# Patient Record
Sex: FEMALE | Race: BLACK OR AFRICAN AMERICAN | Employment: UNEMPLOYED | URBAN - METROPOLITAN AREA
[De-identification: names, ages, dates, MRNs, and addresses within clinical notes are randomized per-mention and may not be internally consistent; named-entity substitution may affect disease eponyms.]

---

## 2016-08-23 LAB
ANTIBODY SCREEN, EXTERNAL: NORMAL
HBSAG, EXTERNAL: NORMAL
RUBELLA, EXTERNAL: NORMAL
T. PALLIDUM, EXTERNAL: NORMAL
TYPE, ABO & RH, EXTERNAL: NORMAL

## 2017-03-01 LAB — GRBS, EXTERNAL: NORMAL

## 2017-03-23 ENCOUNTER — HOSPITAL ENCOUNTER (INPATIENT)
Age: 27
LOS: 2 days | Discharge: HOME | End: 2017-03-25
Attending: OBSTETRICS & GYNECOLOGY | Admitting: OBSTETRICS & GYNECOLOGY
Payer: COMMERCIAL

## 2017-03-23 ENCOUNTER — ANESTHESIA EVENT (OUTPATIENT)
Dept: LABOR AND DELIVERY | Age: 27
End: 2017-03-23
Payer: COMMERCIAL

## 2017-03-23 ENCOUNTER — ANESTHESIA (OUTPATIENT)
Dept: LABOR AND DELIVERY | Age: 27
End: 2017-03-23
Payer: COMMERCIAL

## 2017-03-23 PROBLEM — Z34.90 PREGNANCY: Status: ACTIVE | Noted: 2017-03-23

## 2017-03-23 LAB
ALBUMIN SERPL BCP-MCNC: 3 G/DL (ref 3.5–5)
ALBUMIN/GLOB SERPL: 0.8 {RATIO} (ref 1.1–2.2)
ALP SERPL-CCNC: 106 U/L (ref 45–117)
ALT SERPL-CCNC: 13 U/L (ref 12–78)
ANION GAP BLD CALC-SCNC: 14 MMOL/L (ref 5–15)
AST SERPL W P-5'-P-CCNC: 14 U/L (ref 15–37)
BASOPHILS # BLD AUTO: 0 K/UL (ref 0–0.1)
BASOPHILS # BLD: 0 % (ref 0–1)
BILIRUB SERPL-MCNC: 0.2 MG/DL (ref 0.2–1)
BUN SERPL-MCNC: 9 MG/DL (ref 6–20)
BUN/CREAT SERPL: 15 (ref 12–20)
CALCIUM SERPL-MCNC: 8.7 MG/DL (ref 8.5–10.1)
CHLORIDE SERPL-SCNC: 107 MMOL/L (ref 97–108)
CO2 SERPL-SCNC: 19 MMOL/L (ref 21–32)
CREAT SERPL-MCNC: 0.61 MG/DL (ref 0.55–1.02)
EOSINOPHIL # BLD: 0 K/UL (ref 0–0.4)
EOSINOPHIL NFR BLD: 0 % (ref 0–7)
ERYTHROCYTE [DISTWIDTH] IN BLOOD BY AUTOMATED COUNT: 15.1 % (ref 11.5–14.5)
GLOBULIN SER CALC-MCNC: 3.6 G/DL (ref 2–4)
GLUCOSE BLD STRIP.AUTO-MCNC: 68 MG/DL (ref 65–100)
GLUCOSE BLD STRIP.AUTO-MCNC: 79 MG/DL (ref 65–100)
GLUCOSE BLD STRIP.AUTO-MCNC: 85 MG/DL (ref 65–100)
GLUCOSE BLD STRIP.AUTO-MCNC: 87 MG/DL (ref 65–100)
GLUCOSE SERPL-MCNC: 93 MG/DL (ref 65–100)
HCT VFR BLD AUTO: 32.1 % (ref 35–47)
HGB BLD-MCNC: 10.4 G/DL (ref 11.5–16)
LYMPHOCYTES # BLD AUTO: 17 % (ref 12–49)
LYMPHOCYTES # BLD: 1.6 K/UL (ref 0.8–3.5)
MCH RBC QN AUTO: 26.1 PG (ref 26–34)
MCHC RBC AUTO-ENTMCNC: 32.4 G/DL (ref 30–36.5)
MCV RBC AUTO: 80.5 FL (ref 80–99)
MONOCYTES # BLD: 0.6 K/UL (ref 0–1)
MONOCYTES NFR BLD AUTO: 7 % (ref 5–13)
NEUTS SEG # BLD: 7.1 K/UL (ref 1.8–8)
NEUTS SEG NFR BLD AUTO: 76 % (ref 32–75)
PLATELET # BLD AUTO: 226 K/UL (ref 150–400)
POTASSIUM SERPL-SCNC: 3.7 MMOL/L (ref 3.5–5.1)
PROT SERPL-MCNC: 6.6 G/DL (ref 6.4–8.2)
RBC # BLD AUTO: 3.99 M/UL (ref 3.8–5.2)
SERVICE CMNT-IMP: NORMAL
SODIUM SERPL-SCNC: 140 MMOL/L (ref 136–145)
WBC # BLD AUTO: 9.3 K/UL (ref 3.6–11)

## 2017-03-23 PROCEDURE — 77030014125 HC TY EPDRL BBMI -B: Performed by: ANESTHESIOLOGY

## 2017-03-23 PROCEDURE — 74011000250 HC RX REV CODE- 250

## 2017-03-23 PROCEDURE — 74011250636 HC RX REV CODE- 250/636: Performed by: ANESTHESIOLOGY

## 2017-03-23 PROCEDURE — 77030034849

## 2017-03-23 PROCEDURE — 82962 GLUCOSE BLOOD TEST: CPT

## 2017-03-23 PROCEDURE — 74011250636 HC RX REV CODE- 250/636: Performed by: OBSTETRICS & GYNECOLOGY

## 2017-03-23 PROCEDURE — 00HU33Z INSERTION OF INFUSION DEVICE INTO SPINAL CANAL, PERCUTANEOUS APPROACH: ICD-10-PCS | Performed by: ANESTHESIOLOGY

## 2017-03-23 PROCEDURE — 80053 COMPREHEN METABOLIC PANEL: CPT | Performed by: OBSTETRICS & GYNECOLOGY

## 2017-03-23 PROCEDURE — 75410000000 HC DELIVERY VAGINAL/SINGLE

## 2017-03-23 PROCEDURE — 75410000003 HC RECOV DEL/VAG/CSECN EA 0.5 HR

## 2017-03-23 PROCEDURE — 85025 COMPLETE CBC W/AUTO DIFF WBC: CPT | Performed by: OBSTETRICS & GYNECOLOGY

## 2017-03-23 PROCEDURE — 65410000002 HC RM PRIVATE OB

## 2017-03-23 PROCEDURE — 75410000002 HC LABOR FEE PER 1 HR

## 2017-03-23 PROCEDURE — 77030003666 HC NDL SPINAL BD -A

## 2017-03-23 PROCEDURE — 77010026065 HC OXYGEN MINIMUM MEDICAL AIR

## 2017-03-23 PROCEDURE — 77030031139 HC SUT VCRL2 J&J -A

## 2017-03-23 PROCEDURE — 0KQM0ZZ REPAIR PERINEUM MUSCLE, OPEN APPROACH: ICD-10-PCS | Performed by: OBSTETRICS & GYNECOLOGY

## 2017-03-23 PROCEDURE — 74011250636 HC RX REV CODE- 250/636

## 2017-03-23 PROCEDURE — 74011250637 HC RX REV CODE- 250/637: Performed by: OBSTETRICS & GYNECOLOGY

## 2017-03-23 PROCEDURE — 76060000078 HC EPIDURAL ANESTHESIA

## 2017-03-23 PROCEDURE — 36415 COLL VENOUS BLD VENIPUNCTURE: CPT | Performed by: OBSTETRICS & GYNECOLOGY

## 2017-03-23 RX ORDER — VALACYCLOVIR HYDROCHLORIDE 500 MG/1
TABLET, FILM COATED ORAL 2 TIMES DAILY
COMMUNITY
End: 2022-06-21

## 2017-03-23 RX ORDER — SODIUM CHLORIDE 0.9 % (FLUSH) 0.9 %
5-10 SYRINGE (ML) INJECTION AS NEEDED
Status: DISCONTINUED | OUTPATIENT
Start: 2017-03-23 | End: 2017-03-25 | Stop reason: HOSPADM

## 2017-03-23 RX ORDER — SODIUM CHLORIDE, SODIUM LACTATE, POTASSIUM CHLORIDE, CALCIUM CHLORIDE 600; 310; 30; 20 MG/100ML; MG/100ML; MG/100ML; MG/100ML
125 INJECTION, SOLUTION INTRAVENOUS CONTINUOUS
Status: DISCONTINUED | OUTPATIENT
Start: 2017-03-23 | End: 2017-03-23

## 2017-03-23 RX ORDER — OXYTOCIN IN 5 % DEXTROSE 30/500 ML
PLASTIC BAG, INJECTION (ML) INTRAVENOUS
Status: COMPLETED
Start: 2017-03-23 | End: 2017-03-23

## 2017-03-23 RX ORDER — HYDROCORTISONE ACETATE PRAMOXINE HCL 2.5; 1 G/100G; G/100G
CREAM TOPICAL AS NEEDED
Status: DISCONTINUED | OUTPATIENT
Start: 2017-03-23 | End: 2017-03-25 | Stop reason: HOSPADM

## 2017-03-23 RX ORDER — DOCUSATE SODIUM 100 MG/1
100 CAPSULE, LIQUID FILLED ORAL
Status: DISCONTINUED | OUTPATIENT
Start: 2017-03-23 | End: 2017-03-25 | Stop reason: HOSPADM

## 2017-03-23 RX ORDER — BUPIVACAINE HYDROCHLORIDE AND EPINEPHRINE 2.5; 5 MG/ML; UG/ML
INJECTION, SOLUTION EPIDURAL; INFILTRATION; INTRACAUDAL; PERINEURAL
Status: DISCONTINUED
Start: 2017-03-23 | End: 2017-03-23

## 2017-03-23 RX ORDER — FENTANYL/BUPIVACAINE/NS/PF 2-1250MCG
PREFILLED PUMP RESERVOIR EPIDURAL
Status: DISCONTINUED
Start: 2017-03-23 | End: 2017-03-23

## 2017-03-23 RX ORDER — ONDANSETRON 2 MG/ML
4 INJECTION INTRAMUSCULAR; INTRAVENOUS
Status: DISCONTINUED | OUTPATIENT
Start: 2017-03-23 | End: 2017-03-25 | Stop reason: HOSPADM

## 2017-03-23 RX ORDER — NALOXONE HYDROCHLORIDE 0.4 MG/ML
0.4 INJECTION, SOLUTION INTRAMUSCULAR; INTRAVENOUS; SUBCUTANEOUS AS NEEDED
Status: DISCONTINUED | OUTPATIENT
Start: 2017-03-23 | End: 2017-03-23 | Stop reason: SDUPTHER

## 2017-03-23 RX ORDER — SIMETHICONE 80 MG
80 TABLET,CHEWABLE ORAL
Status: DISCONTINUED | OUTPATIENT
Start: 2017-03-23 | End: 2017-03-25 | Stop reason: HOSPADM

## 2017-03-23 RX ORDER — BUPIVACAINE HYDROCHLORIDE 2.5 MG/ML
INJECTION, SOLUTION EPIDURAL; INFILTRATION; INTRACAUDAL AS NEEDED
Status: DISCONTINUED | OUTPATIENT
Start: 2017-03-23 | End: 2017-03-23 | Stop reason: HOSPADM

## 2017-03-23 RX ORDER — FENTANYL/BUPIVACAINE/NS/PF 2-1250MCG
1-18 PREFILLED PUMP RESERVOIR EPIDURAL CONTINUOUS
Status: DISCONTINUED | OUTPATIENT
Start: 2017-03-23 | End: 2017-03-23

## 2017-03-23 RX ORDER — DIPHENHYDRAMINE HYDROCHLORIDE 50 MG/ML
12.5 INJECTION, SOLUTION INTRAMUSCULAR; INTRAVENOUS
Status: DISCONTINUED | OUTPATIENT
Start: 2017-03-23 | End: 2017-03-25 | Stop reason: HOSPADM

## 2017-03-23 RX ORDER — IBUPROFEN 400 MG/1
800 TABLET ORAL EVERY 8 HOURS
Status: DISCONTINUED | OUTPATIENT
Start: 2017-03-23 | End: 2017-03-25 | Stop reason: HOSPADM

## 2017-03-23 RX ORDER — OXYCODONE AND ACETAMINOPHEN 5; 325 MG/1; MG/1
1 TABLET ORAL
Status: DISCONTINUED | OUTPATIENT
Start: 2017-03-23 | End: 2017-03-25 | Stop reason: HOSPADM

## 2017-03-23 RX ORDER — ACETAMINOPHEN 325 MG/1
650 TABLET ORAL
Status: DISCONTINUED | OUTPATIENT
Start: 2017-03-23 | End: 2017-03-25 | Stop reason: HOSPADM

## 2017-03-23 RX ORDER — FENTANYL CITRATE 50 UG/ML
INJECTION, SOLUTION INTRAMUSCULAR; INTRAVENOUS
Status: DISCONTINUED
Start: 2017-03-23 | End: 2017-03-23

## 2017-03-23 RX ORDER — METFORMIN HYDROCHLORIDE 500 MG/1
TABLET ORAL
Status: ON HOLD | COMMUNITY
End: 2017-03-23 | Stop reason: CLARIF

## 2017-03-23 RX ORDER — SODIUM CHLORIDE 0.9 % (FLUSH) 0.9 %
5-10 SYRINGE (ML) INJECTION EVERY 8 HOURS
Status: DISCONTINUED | OUTPATIENT
Start: 2017-03-23 | End: 2017-03-24

## 2017-03-23 RX ORDER — GLYBURIDE 5 MG/1
TABLET ORAL
COMMUNITY
End: 2017-03-25

## 2017-03-23 RX ORDER — OXYTOCIN/RINGER'S LACTATE 20/1000 ML
125-500 PLASTIC BAG, INJECTION (ML) INTRAVENOUS ONCE
Status: ACTIVE | OUTPATIENT
Start: 2017-03-23 | End: 2017-03-24

## 2017-03-23 RX ORDER — NALOXONE HYDROCHLORIDE 0.4 MG/ML
0.4 INJECTION, SOLUTION INTRAMUSCULAR; INTRAVENOUS; SUBCUTANEOUS AS NEEDED
Status: DISCONTINUED | OUTPATIENT
Start: 2017-03-23 | End: 2017-03-25 | Stop reason: HOSPADM

## 2017-03-23 RX ORDER — OXYTOCIN IN 5 % DEXTROSE 30/500 ML
1-25 PLASTIC BAG, INJECTION (ML) INTRAVENOUS
Status: DISCONTINUED | OUTPATIENT
Start: 2017-03-23 | End: 2017-03-23

## 2017-03-23 RX ADMIN — IBUPROFEN 800 MG: 400 TABLET, FILM COATED ORAL at 21:11

## 2017-03-23 RX ADMIN — SODIUM CHLORIDE, SODIUM LACTATE, POTASSIUM CHLORIDE, AND CALCIUM CHLORIDE 125 ML/HR: 600; 310; 30; 20 INJECTION, SOLUTION INTRAVENOUS at 10:15

## 2017-03-23 RX ADMIN — Medication 1 MILLI-UNITS/MIN: at 12:29

## 2017-03-23 RX ADMIN — SODIUM CHLORIDE, SODIUM LACTATE, POTASSIUM CHLORIDE, AND CALCIUM CHLORIDE 125 ML/HR: 600; 310; 30; 20 INJECTION, SOLUTION INTRAVENOUS at 15:55

## 2017-03-23 RX ADMIN — FENTANYL 0.2 MG/100ML-BUPIV 0.125%-NACL 0.9% EPIDURAL INJ 12 ML/HR: 2/0.125 SOLUTION at 14:53

## 2017-03-23 RX ADMIN — BUPIVACAINE HYDROCHLORIDE 5 ML: 2.5 INJECTION, SOLUTION EPIDURAL; INFILTRATION; INTRACAUDAL at 14:41

## 2017-03-23 RX ADMIN — BUPIVACAINE HYDROCHLORIDE 5 ML: 2.5 INJECTION, SOLUTION EPIDURAL; INFILTRATION; INTRACAUDAL at 14:47

## 2017-03-23 RX ADMIN — BUPIVACAINE HYDROCHLORIDE 5 ML: 2.5 INJECTION, SOLUTION EPIDURAL; INFILTRATION; INTRACAUDAL at 14:43

## 2017-03-23 RX ADMIN — SODIUM CHLORIDE, SODIUM LACTATE, POTASSIUM CHLORIDE, AND CALCIUM CHLORIDE 999 ML/HR: 600; 310; 30; 20 INJECTION, SOLUTION INTRAVENOUS at 14:03

## 2017-03-23 RX ADMIN — SODIUM CHLORIDE, SODIUM LACTATE, POTASSIUM CHLORIDE, AND CALCIUM CHLORIDE 500 ML: 600; 310; 30; 20 INJECTION, SOLUTION INTRAVENOUS at 17:53

## 2017-03-23 NOTE — PROGRESS NOTES
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     Dr Cecy Mera at bedside; discussed [address];

## 2017-03-23 NOTE — PROGRESS NOTES
Comfortable after epidural  Visit Vitals    /57 (BP 1 Location: Left arm, BP Patient Position: Lying right side)    Pulse 96    Temp 97.6 °F (36.4 °C)    Resp 16    Ht 5' 7\" (1.702 m)    Wt 55.3 kg (122 lb)    SpO2 99%    Breastfeeding No    BMI 19.11 kg/m2     , moderate variability, +accels  [place] q 2 min on 6 pit  SVE 4/80/-1  Continue pitocin augmentation

## 2017-03-23 NOTE — PROGRESS NOTES
1458 - Bedside and Verbal shift change report given to Randi Martinez RN (oncoming nurse) by FAITH Cordon RN (offgoing nurse). Report included the following information SBAR, Intake/Output, MAR and Recent Results. 1527 - BP 80's/40s in left arm which is elevated b/c pt turned to right side. BP cuff moved to right arm and BP WNL. Pt denies s/s hypotension. 1807 - Head delivered and then shoulder dystocia called by Dr. Ernestine Sams. HOB immediately lowered, emergency bell pulled, pt's legs put in Karo and suprapubic pressure done without success. While suprapubic pressure applied, Dr. Ernestine Sams delivered the posterior arm. Total time from recognition to delivery of shoulders and body was 30 seconds.

## 2017-03-23 NOTE — PROGRESS NOTES
To bedside for variable decels  FHT 150s, moderate variability, variables with contractions to 110s  toco q 2-3 min  SVE 8/c/0  Will reposition  Anticipate

## 2017-03-23 NOTE — ANESTHESIA PREPROCEDURE EVALUATION
Anesthetic History   No history of anesthetic complications            Review of Systems / Medical History  Patient summary reviewed, nursing notes reviewed and pertinent labs reviewed    Pulmonary  Within defined limits                 Neuro/Psych   Within defined limits           Cardiovascular  Within defined limits                Exercise tolerance: >4 METS     GI/Hepatic/Renal  Within defined limits              Endo/Other  Within defined limits           Other Findings              Physical Exam    Airway  Mallampati: II  TM Distance: 4 - 6 cm  Neck ROM: normal range of motion   Mouth opening: Normal     Cardiovascular  Regular rate and rhythm,  S1 and S2 normal,  no murmur, click, rub, or gallop             Dental  No notable dental hx       Pulmonary  Breath sounds clear to auscultation               Abdominal  GI exam deferred       Other Findings            Anesthetic Plan    ASA: 2  Anesthesia type: epidural            Anesthetic plan and risks discussed with: Patient

## 2017-03-23 NOTE — LACTATION NOTE
.Discussed with mother her plan for feeding. Reviewed the benefits of exclusive breast milk feeding during the hospital stay. Informed mother of the risks of using formula to supplement in the first few days of life as well as the benefits of successful breast milk feeding; referred mother to the handout in her admission packet related to these topics. Mother acknowledges understanding of information reviewed and states that it is her plan to formula feed exclusively her infant. Will support her choice and offer additional information as needed.

## 2017-03-23 NOTE — ANESTHESIA PROCEDURE NOTES
Epidural Block    Start time: 3/23/2017 2:35 PM  End time: 3/23/2017 2:45 PM  Performed by: Bev Arguello  Authorized by: Fredy Lara     Pre-Procedure  Indication: labor epidural    Preanesthetic Checklist: patient identified, risks and benefits discussed, anesthesia consent, site marked, patient being monitored, timeout performed and anesthesia consent      Epidural:   Patient position:  Seated  Prep region:  Lumbar  Prep: Patient draped and DuraPrep    Location:  L2-3    Needle and Epidural Catheter:   Needle Type:  Tuohy  Needle Gauge:  17 G  Injection Technique:  Loss of resistance using saline  Attempts:  1  Catheter Size:  19 G  Catheter at Skin Depth (cm):  12  Depth in Epidural Space (cm):  4  Events: no blood with aspiration, no cerebrospinal fluid with aspiration, no paresthesia and negative aspiration test    Test Dose:  Bupivacaine 0.25% w/ epi and negative    Assessment:   Catheter Secured:  Tegaderm and tape  Insertion:  Uncomplicated  Patient tolerance:  Patient tolerated the procedure well with no immediate complications      -paresthesia.

## 2017-03-23 NOTE — PROGRESS NOTES
Problem: Patient Education: Go to Patient Education Activity  Goal: Patient/Family Education  Outcome: Resolved/Met Date Met:  03/23/17  Up with assist

## 2017-03-23 NOTE — ROUTINE PROCESS
Bedside shift change report given to ISAMAR Macias (oncoming nurse) by VANE Peters RN (offgoing nurse). Report included the following information SBAR, MAR and Recent Results.

## 2017-03-23 NOTE — IP AVS SNAPSHOT
[address] [facility] 
[phone] Patient: [facility] THE MRN: [id number] YFS:[id number]/[id number] You are allergic to the following No active allergies Recent Documentation Height Weight Breastfeeding? BMI OB Status Smoking Status 1.702 m 100.7 kg No 34.77 kg/m2 Pregnant Never Smoker Unresulted Labs Order Current Status SAMPLE TO BLOOD BANK In process Emergency Contacts Name Discharge Info Relation Home Work Mobile [address]  Parent [1] [phone] About your hospitalization You were admitted on:  March 23, 2017 You last received care in the:  [facility] LABOR & DELIVERY You were discharged on:  March 25, 2017 Unit phone number:  [phone] Why you were hospitalized Your primary diagnosis was:  Not on File Your diagnoses also included:  Pregnancy Providers Seen During Your Hospitalizations Provider Role Specialty Primary office phone Jose Ramon iWlkes MD Attending Provider Obstetrics & Gynecology [phone] Your Primary Care Physician (PCP) Primary Care Physician Office Phone Office Fax NONE ** None ** ** None ** Follow-up Information Follow up With Details Comments Contact Info None   None (395) Patient stated that they have no PCP Current Discharge Medication List  
  
CONTINUE these medications which have CHANGED Dose & Instructions Dispensing Information Comments Morning Noon Evening Bedtime  
 ibuprofen 600 mg tablet Commonly known as:  MOTRIN What changed:  when to take this Your last dose was: Your next dose is:    
   
   
 Dose:  600 mg Take 1 Tab by mouth every six (6) hours as needed for Pain for up to 360 days. Quantity:  30 Tab Refills:  0 CONTINUE these medications which have NOT CHANGED Dose & Instructions Dispensing Information Comments Morning Noon Evening Bedtime  
 oxyCODONE-acetaminophen 5-325 mg per tablet Commonly known as:  PERCOCET Your last dose was: Your next dose is:    
   
   
 Dose:  1-2 Tab Take 1-2 Tabs by mouth every four (4) hours as needed for Pain. Max Daily Amount: 12 Tabs. Quantity:  20 Tab Refills:  0 Prenat MV-Iron-[id number]-[id number]-[id number] -1 mg Cmpk Your last dose was: Your next dose is: Take  by mouth. Refills:  0 STOP taking these medications   
 glyBURIDE 5 mg tablet Commonly known as:  Destineechristina Riojas ASK your doctor about these medications Dose & Instructions Dispensing Information Comments Morning Noon Evening Bedtime VALTREX 500 mg tablet Generic drug:  valACYclovir Your last dose was: Your next dose is: Take  by mouth two (2) times a day. Refills:  0 Where to Get Your Medications Information on where to get these meds will be given to you by the nurse or doctor. ! Ask your nurse or doctor about these medications  
  ibuprofen 600 mg tablet  
 oxyCODONE-acetaminophen 5-325 mg per tablet Discharge Instructions POST DELIVERY DISCHARGE INSTRUCTIONS [facility]: [facility] THE YOB: 1990 Primary Diagnosis: Active Problems: * No active hospital problems. * General:  
 
Diet/Diet Restrictions: 
· Eight 8-ounce glasses of fluid daily (water, juices); avoid excessive caffeine intake. · Meals/snacks as desired which are high in fiber and carbohydrates and low in fat and cholesterol. Medications:  
 
 
 
Physical Activity / Restrictions / Safety: · Avoid heavy lifting, no more that 8 lbs. For 2-3 weeks;  
· Limit use of stairs to 2 times daily for the first week home. · No driving for one week. · Avoid intercourse 4-6 weeks, no douching or tampon use. · Check with obstetrician before starting or resuming an exercise program.   
 
Discharge Instructions/Special Treatment/Home Care Needs:  
 
· Continue prenatal vitamins. · Continue to use squirt bottle with warm water on your episiotomy after each bathroom use until bleeding stops. · If steri-strips applied to your incision, remove in 7-10 days. Call your doctor for the following: · Fever over 101 degrees by mouth. · Vaginal bleeding heavier than a normal menstrual period or clots larger than a golf ball. · Red streaks or increased swelling of legs, painful red streaks on your breast. 
· Painful urination, constipation and increased pain or swelling or discharge with your incision. · If you feel extremely anxious or overwhelmed. · If you have thoughts of harming yourself and/or your baby. Pain Management:  
 
· Take Acetaminophen (Tylenol) or Ibuprofen (Advil, Motrin), as directed for pain. · Use a warm Sitz bath 3 times daily to relieve episiotomy or hemorrhoidal discomfort. · For hemorrhoidal discomfort, use Tucks and Anusol cream as needed and directed. · Heating pad to  incision as needed. Follow-Up Care:  
 
Appointment with MD: Follow-up Appointments Procedures  FOLLOW UP VISIT Appointment in: 6 Weeks With Dr. Manny Blank for postpartum check and 2 hour gtt With Dr. Manny Blank for postpartum check and 2 hour gtt Standing Status:   Standing Number of Occurrences:   1 Order Specific Question:   Appointment in Answer:   6 Weeks Telephone number: [phone] Signed By: Clau Lopez MD                                                                                                   Date: 3/25/2017 Time: 9:39 AM 
 
Discharge Orders None MyChart Announcement  We are excited to announce that we are making your provider's discharge notes available to you in [facility]. You will see these notes when they are completed and signed by the physician that discharged you from your recent hospital stay. If you have any questions or concerns about any information you see in [facility], please call the Health Information Department where you were seen or reach out to your Primary Care Provider for more information about your plan of care. Introducing [facility]! Linda Garcia introduces [facility] patient portal. Now you can access parts of your medical record, email your doctor's office, and request medication refills online. 1. In your internet browser, go to [email or web address]://[employer]. [facility] 2. Click on the First Time User? Click Here link in the Sign In box. You will see the New Member Sign Up page. 3. Enter your [facility] Access Code exactly as it appears below. You will not need to use this code after youve completed the sign-up process. If you do not sign up before the expiration date, you must request a new code. · [facility] Access Code: [email or web address] Expires: 6/21/2017 10:43 AM 
 
4. Enter the last four digits of your Social Security Number (xxxx) and Date of Birth (mm/dd/yyyy) as indicated and click Submit. You will be taken to the next sign-up page. 5. Create a [facility] [facility] ID. This will be your [facility] login ID and cannot be changed, so think of one that is secure and easy to remember. 6. Create a [facility] password. You can change your password at any time. 7. Enter your Password Reset Question and Answer. This can be used at a later time if you forget your password. 8. Enter your e-mail address. You will receive e-mail notification when new information is available in [address] 9. Click Sign Up. You can now view and download portions of your medical record. 10. Click the Download Summary menu link to download a portable copy of your medical information. If you have questions, please visit the Frequently Asked Questions section of the MyChart website. Remember, MyChart is NOT to be used for urgent needs. For medical emergencies, dial 911. Now available from your iPhone and Android! General Information Please provide this summary of care documentation to your next provider. Patient Signature:  ____________________________________________________________ Date:  ____________________________________________________________  
  
[employer] Provider Signature:  ____________________________________________________________ Date:  ____________________________________________________________

## 2017-03-23 NOTE — IP AVS SNAPSHOT
Summary of Care Report The Summary of Care report has been created to help improve care coordination. Users with access to [employer] or [address] (Web-based application) may access additional patient information including the Discharge Summary. If you are not currently a [address] user and need more information, please call the number listed below in the [place] [address] section and ask to be connected with Medical Records. Facility Information Name Address Phone [address] [address] [phone] Patient Information Patient Name Sex ROB Peck ([id number]) Female 1990 Discharge Information Admitting Provider Service Area Unit Ayde Meeks MD / Marian Murillo [address] [facility] [address]  Delivery / [phone] Discharge Provider Discharge Date/Time Discharge Disposition Destination (none) 3/25/2017 (Pending) A[facility] (none) Patient Language Language ENGLISH [13] You are allergic to the following No active allergies Current Discharge Medication List  
  
CONTINUE these medications which have CHANGED Dose & Instructions Dispensing Information Comments  
 ibuprofen 600 mg tablet Commonly known as:  MOTRIN What changed:  when to take this Dose:  600 mg Take 1 Tab by mouth every six (6) hours as needed for Pain for up to 360 days. Quantity:  30 Tab Refills:  0 CONTINUE these medications which have NOT CHANGED Dose & Instructions Dispensing Information Comments  
 oxyCODONE-acetaminophen 5-325 mg per tablet Commonly known as:  PERCOCET Dose:  1-2 Tab Take 1-2 Tabs by mouth every four (4) hours as needed for Pain. Max Daily Amount: 12 Tabs. Quantity:  20 Tab Refills:  0 Prenat MV-Iron-Ca-MY-Ii9-BheGA -1 mg Cmpk Take  by mouth. Refills:  0 STOP taking these medications Comments  
 glyBURIDE 5 mg tablet Commonly known as:  Lew Dasilva ASK your doctor about these medications Dose & Instructions Dispensing Information Comments VALTREX 500 mg tablet Generic drug:  valACYclovir Take  by mouth two (2) times a day. Refills:  0 Current Immunizations Name Date Influenza Vaccine Split  Deferred (Patient Refused),  Deferred (Patient Refused) Surgery Information ID Date/Time Status Primary Surgeon All Procedures Location [id number] 3/23/2017 Complete   ZZANESTHESIA [facility] - DO NOT SCHEDULE Follow-up Information Follow up With Details Comments Contact Info None   None (395) Patient stated that they have no PCP Discharge Instructions POST DELIVERY DISCHARGE INSTRUCTIONS Name: [facility] THE YOB: 1990 Primary Diagnosis: Active Problems: * No active hospital problems. * General:  
 
Diet/Diet Restrictions: 
· Eight 8-ounce glasses of fluid daily (water, juices); avoid excessive caffeine intake. · Meals/snacks as desired which are high in fiber and carbohydrates and low in fat and cholesterol. Medications:  
 
 
 
Physical Activity / Restrictions / Safety: · Avoid heavy lifting, no more that 8 lbs. For 2-3 weeks;  
· Limit use of stairs to 2 times daily for the first week home. · No driving for one week. · Avoid intercourse 4-6 weeks, no douching or tampon use. · Check with obstetrician before starting or resuming an exercise program.   
 
Discharge Instructions/Special Treatment/Home Care Needs:  
 
· Continue prenatal vitamins. · Continue to use squirt bottle with warm water on your episiotomy after each bathroom use until bleeding stops. · If steri-strips applied to your incision, remove in 7-10 days. Call your doctor for the following: · Fever over 101 degrees by mouth. · Vaginal bleeding heavier than a normal menstrual period or clots larger than a golf ball. · Red streaks or increased swelling of legs, painful red streaks on your breast. 
· Painful urination, constipation and increased pain or swelling or discharge with your incision. · If you feel extremely anxious or overwhelmed. · If you have thoughts of harming yourself and/or your baby. Pain Management:  
 
· Take Acetaminophen (Tylenol) or Ibuprofen (Advil, Motrin), as directed for pain. · Use a warm Sitz bath 3 times daily to relieve episiotomy or hemorrhoidal discomfort. · For hemorrhoidal discomfort, use Tucks and Anusol cream as needed and directed. · Heating pad to  incision as needed. Follow-Up Care:  
 
Appointment with MD: Follow-up Appointments Procedures  FOLLOW UP VISIT Appointment in: 6 Weeks With Dr. Ryder Harkins for postpartum check and 2 hour gtt With Dr. Ryder Harkins for postpartum check and 2 hour gtt Standing Status:   Standing Number of Occurrences:   1 Order Specific Question:   Appointment in Answer:   6 Weeks Telephone number: [phone] Signed By: Hue Jeff MD                                                                                                   Date: 3/25/2017 Time: 9:39 AM 
 
Chart Review Routing History No Routing History on File

## 2017-03-23 NOTE — PROGRESS NOTES
Feeling rare contractions. FHT cat one  [place] irregular q 5-10 min  SVE unchanged  Will start pitocin for augmentation. She is in agreement with plan. Wants epidural when painful.

## 2017-03-23 NOTE — L&D DELIVERY NOTE
Delivery Summary  Patient: Shabnam Tate             Circumcision:   NA-female  Additional Delivery Comments - Admitted at 38w4d with SROM, augmented with pitocin. Intermittent variable decels, progressed to complete. Pushed well with 3 contractions. Head delivered in OA position then turtle sign noted. Gentle traction on head did not effect delivery. Nursing alerted to dystocia and additional staff called to room. Placed in Karo position and suprapubic pressure applied with no success. The posterior arm (left) was then grasped and delivered and the anterior shoulder and remainder of body were then delivered. Dystocia in its entirety was less than 60 seconds. Infant placed on mothers abdomen and cord clamped and cut. Infant was moving both upper extremities normally. 2nd degree perineal lac was then repaired with 2-0 vicryl. Hemostatic scottie-clitoral lac was not repaired. Placenta was then delivered and intact. Information for the patient's : Jesúsmacieljamilah moss [[id number]]       Labor Events:    Labor: No   Rupture Date: 3/23/2017   Rupture Time:     Rupture Type SROM   Amniotic Fluid Volume:  Moderate    Amniotic Fluid Description: Clear     Induction: None       Augmentation: Oxytocin   Labor Events: None     Cervical Ripening:     None     Delivery Events:  Episiotomy: None   Laceration(s): Second degree perineal;Other (comment) scottie-clitoral   Repaired: Yes    Number of Repair Packets: 2   Suture Type and Size: Vicryl 2-0     Estimated Blood Loss (ml): 400ml       Delivery Date: 3/23/2017    Delivery Time: 6:08 PM  Delivery Type: Vaginal, Spontaneous Delivery  Sex:  Female     Gestational Age: 38w3d   Delivery Clinician:  Mounika Arellano  Living Status: Yes   Delivery Location: L&D            APGARS  One minute Five minutes Ten minutes   Skin color: 1   1        Heart rate: 2   2        Grimace: 2   2        Muscle tone: 2   2        Breathin   2        Totals: 9   9 Presentation: Vertex    Position:   Occiput Anterior  Resuscitation Method:  Suctioning-bulb; Tactile Stimulation     Meconium Stained: None      Cord Vessels: 3 Vessels      Cord Events:    Cord Blood Sent?:  No    Blood Gases Sent?:  Yes    Placenta:  Date/Time: 3/23  6:25 PM  Removal: Spontaneous      Appearance: Intact      Measurements:  Birth Weight: 4.15 kg      Birth Length: 55.9 cm      Head Circumference: 35.5 cm      Chest Circumference: 35.5 cm     Abdominal Girth: 33 cm    Other Providers:   JAC BACON;RODDY BARBER;;;;;;;;;Malvin LY HEATHER B, Obstetrician;Primary Nurse;Primary  Nurse;Nicu Nurse;Neonatologist;Anesthesiologist;Crna;Nurse Practitioner;Midwife;Nursery Nurse;Scrub Tech;Staff Nurse           Cord pH:  7.3     Episiotomy: None   Laceration(s): Second degree perineal;Other (comment)  scottie-clitoral   Estimated Blood Loss (ml): 400    Labor Events  Method: None       Augmentation: Oxytocin   Cervical Ripening:       None        Operative Vaginal Delivery - none    Group B Strep:   Lab Results   Component Value Date/Time    GrBStrep, External neg 2017     Information for the patient's :   Lisa Caldwell, JOSE ROBERTO Minerva [[id number]]   No results found for: ABORH, PCTABR, PCTDIG, BILI, ABORHEXT, ABORH    No results found for: APH, APCO2, APO2, AHCO3, ABEC, ABDC, O2ST, EPHV, PCO2V, PO2V, HCO3V, EBEV, EBDV, SITE, RSCOM

## 2017-03-23 NOTE — H&P
EDC:2017  EGA: 38 weeks, 4 days      Vital Signs   31Years Old Female  Weight: 222 pounds  BP:       140/60    Pap/HPV/Gardasil History   History of abnormal pap: yes  Gardasil Injection History: Pt Advised/Considering Gardasil    Patient's Prenatal Care with Doctor of Record Brandi Vazquez MD Notable For -    Diabetes gestational on glyburide  HSV valtrex 36 weeks ____  Fibroids pregnant, 5 cm subserous   lab screening  Normal pregnancy multigravida  Family History of Ovarian Cancer              Past Pregnancy History      :  4     Term Births:  1     Premature Births: 0     Living Children: 1     Para:   1     Mult. Births:  0     Prev : 0     Prev.  attempt? 0     Aborta:  2     Elect. Ab:  2     Spont. Ab:  0     Ectopics:  0    Pregnancy # 1     Comments:  EAB    Pregnancy # 2     Comments:  EAB    Pregnancy # 3     Delivery date:   2012     Weeks Gestation: 38w2d      labor:   no     Delivery type:        Anesthesia type:   epidural     Delivery location:   [facility]     Infant Sex:  Male     Birth weight:  3.29kg     Name:  Kirill Weiss     Comments:  Chidi Munoz 9&9. Second degree perineal           Allergies      Medications Removed from Medication List        Flowsheet View for Follow-up Visit     Estimated weeks of        gestation:  38 4/7     Weight:  222     Blood pressure: 140 / 60     Urine Protein:  2+     Urine Glucose: N     Headache:  No     Nausea/vomiting: No     Edema:  0     Vaginal bleeding: no     Vaginal discharge: leak? Fetal activity:  yes     FHR:   144     Labor symptoms: yes     Fetal position:  vertex     Cx Dilation:  2cm     Cx Effacement: 50%     Cx Station:  -3     Preceptor:  cpm     Comment:  leaking fluid        Impression & Recommendations:    Problem # 1:  Diabetes gestational on glyburide (SRM-768.21) (VTJ52-T13.410)  Will monitor blood sugars in labor and treat as needed.     Problem # 2:  Normal pregnancy multigravida (ICD-V22.1) (HVL99-O61.83)  SROM, early labor. WIll augment as needed. Epidural prn.  gbs neg   vtx  last growth 3/6 7lb2oz  pelvis proven to 7lb4oz    Problem # 3:  HSV valtrex 36 weeks ____ (PPQ-231.98) (AJY44-I20.313)  no lesions noted      Medications (at conclusion of this visit)    2017 VALTREX 500 MG TABS (VALACYCLOVIR HCL) 1 tablet orally twice daily  2017 GLYBURIDE 5 MG ORAL TABS (GLYBURIDE) 1 po qhs  2017 SLOW  (50 FE) MG TBCR (FERROUS SULFATE DRIED) 1 by mouth every day  09/15/2016 TYLENOL CAPS (ACETAMINOPHEN CAPS) Prescribed by [facility] MD  09/15/2016 PNV-DHA CAPS (PRENAT W/O G-MM-JWHNYXB-FA-DHA CAPS) Prescribed by [facility] MD          Primary Provider:  Prashanth Hollis. Richard Kent MD    CC:  Leaking Fluid. History of Present Illness:  30 yo  @ 38w4d pt c/o rom with mild ctx. gest dm managed on glyburide. Normal surveillance. 86% growth. 5 cm fundal fibroid. h/o hsv.  on valtrex. no lesions. ROM at 8am today, big gush and continuing to leak.         Past Medical History:     Reviewed history from 09/15/2016 and no changes required:        Abnormal Pap Smear 12 ASCUS +hpv, 2013-WNL, 2014 ASCUS (negative HPV)        2010, 2011- HSV (+)        Fibroids        2014 (+) chlamydia    Past Surgical History:     Reviewed history from 2012 and no changes required:        Eye surgery        TAB x2        Spontaneous Vaginal Delivery male [address] History Summary:      Reviewed history Last on 2017 and no changes required:2017  MGM - Has Family History of Ovarian Cancer - Entered On: 2014  Mother Joseline Reese.) - Has Family History of Hypertension - Entered On: 2014    General Comments - FH:  Family history transferred to [address] [place] [address] [address] [facility]        Social History:     Reviewed history from 09/15/2016 and no changes required:        Single        wks at [employer]      Risk Factors:     Smoked Tobacco Use:  Never smoker  Smokeless Tobacco Use: Never  Passive smoke exposure:  no  Drug use:  no  Caffeine use:  1 drinks per day  Alcohol use:  no  Exercise:  yes     Times per week:  2     Type of Exercise:  Gym  Seatbelt use:  preg- %  Sun Exposure:  occasionally      Review of Systems        See HPI    Except as noted in the HPI, the review of systems is negative for General, Breast, , Resp, GI, Endo, MS, Psych and Heme. Vital Signs    Blood Pressure: 140 / 60    Weight:  222 pounds      Physical Exam     General           General appearance:  no acute distress    Head           Inspection:   normal    Extremeties           Extremeties:  0 edema    Abdomen           Abdomen:  gravid    Pelvic Exam           EGBUS:  no lesions          Vagina:  normal appearing without lesions or discharge          Uterus:  gravid                  Dilation: : 2cm                  Effacement:  50%                  Station:  -3                  Presentation:  vertex    Allergies      Medications Removed from Medication List        Impression & Recommendations:    Problem # 1:  Diabetes gestational on glyburide (DSK-700.63) (AEU43-L30.410)  Will monitor blood sugars in labor and treat as needed. Problem # 2:  Normal pregnancy multigravida (ICD-V22.1) (EGV53-B50.83)  SROM, early labor. WIll augment as needed.   Epidural prn.  gbs neg   vtx  last growth 3/6 7lb2oz  pelvis proven to 7lb4oz    Problem # 3:  HSV valtrex 36 weeks ____ (YWA-271.26) (AKB48-Q38.313)  no lesions noted      Medications (at conclusion of this visit)    03/01/2017 VALTREX 500 MG TABS (VALACYCLOVIR HCL) 1 tablet orally twice daily  02/07/2017 GLYBURIDE 5 MG ORAL TABS (GLYBURIDE) 1 po qhs  01/04/2017 SLOW  (50 FE) MG TBCR (FERROUS SULFATE DRIED) 1 by mouth every day  09/15/2016 TYLENOL CAPS (ACETAMINOPHEN CAPS) Prescribed by non 606/[address]Missy Cedeno MD  09/15/2016 PNV-DHA CAPS (PRENAT W/O K-GC-UCJRWLM-FA-DHA CAPS) Prescribed by non [address] MD          LABORATORY DATA   TEST DATE RESULT   Group B Strep culture 03/01/2017 Negative                                   (Group B Strep Culture Result Field)   Blood Type 08/23/2016 O                                             (Blood Type Result Field)   Rh 08/23/2016 Positive                                   (Rh Result Field)   Rhogam Inj Given 12/20/2012 *   Tdap Vaccine Given 02/14/2017 declined   Antibody Screen 01/02/2017 Negative   Rubella  [facility] Reference Ranges On or After 3/10/14                  <0.90              Non-immune      0.90 - 0.99     Equivocal      >0.99              Immune    [facility] Reference Ranges  Before 3/10/14           <5                 Non-immune             5 - 9               Equivocal            >9                 Immune  [facility] Reference Ranges       < Or = 0.90       Negative             0.91-1.09          Equivocal            > Or = 1.10       Positive   08/23/2016     7.23     TPA (T Pallidum Antibodies) 01/02/2017 Negative   Serology (RPR) 12/20/2012 *   HBsAg 08/23/2016 Negative   HIV 08/23/2016 Non Reactive   Hemoglobin 01/02/2017 10.4   Hematocrit 01/02/2017 30.7   Platelets 79/76/7361 273 X10E3/UL   TSH 12/20/2012 *   Urine Culture 08/23/2016 Negative   GC DNA Probe 08/23/2016 Negative   Chlamydia DNA 08/23/2016 Negative   PAP 08/23/2016 NIL   Flu Vaccine Given 12/07/2016 declined   HGBA1C 12/20/2012 *   HGB Electro 08/23/2016 AA   T4, Free 12/20/2012 *   BG Fasting 01/05/2017 111   GTT 1H 50G 01/02/2017 180   GTT 1H 100G 01/05/2017 178   GTT 2H 100G 01/05/2017 149   GTT 3H 100G 01/05/2017 122   Glucose Plasma 12/20/2012 *   CF Accept or Decline 08/23/2016 declined   CF Screen Result     Nuchal Trans 08/23/2016 declined   AFP Only     Tetra 10/13/2016 *Screen Negative*   AFP Serum 12/20/2012 *   CVS 08/23/2016 declined   AFP Amniotic 12/20/2012 *   Amnio Karyo 08/23/2016 declined   FISH 12/20/2012 *   GC Culture 12/20/2012 *   Chlamydia Cult 12/20/2012 *   Ureaplasma     Mycoplasma     WBC 08/23/2016 9.7 X10E3/UL   RBC 08/23/2016 4.64 X10E6/UL   MCV 08/23/2016 81   MCH 08/23/2016 27.2   MCHC RBC 08/23/2016 33.5     ULTRASOUND DATA   TEST DATE RESULT   Estimated Fetal Weight 03/06/2017 6424^3580 g&grams                                     Weight % 03/06/2017 86^86% %&percent                                                BLADIMIR 03/22/2017 43.92^85.8 cm&centimeters                    BPP 03/22/2017 8   Cervical Length (mm)       ]      Electronically signed by Domonique Garcia MD on 03/23/2017 at 11:22 AM    ________________________________________________________________________

## 2017-03-23 NOTE — PROGRESS NOTES
1221 sve by dr Madonna Farr, start pitocin    9367 pt requesting epidural, ivf increased    1415 dr Fei Maldonado notified of pt request for epidural

## 2017-03-23 NOTE — IP AVS SNAPSHOT
Current Discharge Medication List  
  
CONTINUE these medications which have CHANGED Dose & Instructions Dispensing Information Comments Morning Noon Evening Bedtime  
 ibuprofen 600 mg tablet Commonly known as:  MOTRIN What changed:  when to take this Your last dose was: Your next dose is:    
   
   
 Dose:  600 mg Take 1 Tab by mouth every six (6) hours as needed for Pain for up to 360 days. Quantity:  30 Tab Refills:  0 CONTINUE these medications which have NOT CHANGED Dose & Instructions Dispensing Information Comments Morning Noon Evening Bedtime  
 oxyCODONE-acetaminophen 5-325 mg per tablet Commonly known as:  PERCOCET Your last dose was: Your next dose is:    
   
   
 Dose:  1-2 Tab Take 1-2 Tabs by mouth every four (4) hours as needed for Pain. Max Daily Amount: 12 Tabs. Quantity:  20 Tab Refills:  0 Prenat MV-Iron-Ca-WF-Yg9-InuNT -1 mg Cmpk Your last dose was: Your next dose is: Take  by mouth. Refills:  0 STOP taking these medications   
 glyBURIDE 5 mg tablet Commonly known as:  Auther Feroz ASK your doctor about these medications Dose & Instructions Dispensing Information Comments Morning Noon Evening Bedtime VALTREX 500 mg tablet Generic drug:  valACYclovir Your last dose was: Your next dose is: Take  by mouth two (2) times a day. Refills:  0 Where to Get Your Medications Information on where to get these meds will be given to you by the nurse or doctor. ! Ask your nurse or doctor about these medications  
  ibuprofen 600 mg tablet  
 oxyCODONE-acetaminophen 5-325 mg per tablet

## 2017-03-24 PROBLEM — Z34.90 PREGNANCY: Status: RESOLVED | Noted: 2017-03-23 | Resolved: 2017-03-24

## 2017-03-24 PROCEDURE — 65410000002 HC RM PRIVATE OB

## 2017-03-24 PROCEDURE — 74011250637 HC RX REV CODE- 250/637: Performed by: OBSTETRICS & GYNECOLOGY

## 2017-03-24 RX ADMIN — IBUPROFEN 800 MG: 400 TABLET, FILM COATED ORAL at 13:13

## 2017-03-24 RX ADMIN — IBUPROFEN 800 MG: 400 TABLET, FILM COATED ORAL at 05:28

## 2017-03-24 RX ADMIN — OXYCODONE HYDROCHLORIDE AND ACETAMINOPHEN 1 TABLET: 5; 325 TABLET ORAL at 21:57

## 2017-03-24 RX ADMIN — OXYCODONE HYDROCHLORIDE AND ACETAMINOPHEN 1 TABLET: 5; 325 TABLET ORAL at 10:15

## 2017-03-24 RX ADMIN — OXYCODONE HYDROCHLORIDE AND ACETAMINOPHEN 1 TABLET: 5; 325 TABLET ORAL at 15:28

## 2017-03-24 RX ADMIN — OXYCODONE HYDROCHLORIDE AND ACETAMINOPHEN 1 TABLET: 5; 325 TABLET ORAL at 00:40

## 2017-03-24 RX ADMIN — OXYCODONE HYDROCHLORIDE AND ACETAMINOPHEN 1 TABLET: 5; 325 TABLET ORAL at 06:14

## 2017-03-24 RX ADMIN — IBUPROFEN 800 MG: 400 TABLET, FILM COATED ORAL at 21:54

## 2017-03-24 NOTE — ROUTINE PROCESS
Bedside shift change report given to SOUMYA Martinez RN (oncoming nurse) by VANE Rodriguez RN (offgoing nurse). Report included the following information SBAR, MAR and Recent Results.

## 2017-03-24 NOTE — PROGRESS NOTES
Bedside and Verbal shift change report given to SMILEY Rey RN  (oncoming nurse) by PATTIE Lyn (offgoing nurse). Report included the following information SBAR, Kardex, Procedure Summary, Intake/Output, MAR and Recent Results.

## 2017-03-24 NOTE — PROGRESS NOTES
TRANSFER - OUT REPORT:    Verbal report given to SOUMYA Garcia RN (name) on Mariela  being transferred to MIU (unit) for routine progression of care       Report consisted of patients Situation, Background, Assessment and   Recommendations(SBAR). Information from the following report(s) SBAR, Procedure Summary, Intake/Output, MAR and Recent Results was reviewed with the receiving nurse. Lines:   Peripheral IV 03/23/17 Right Wrist (Active)   Site Assessment Clean, dry, & intact 3/23/2017  3:00 PM   Phlebitis Assessment 0 3/23/2017  3:00 PM   Infiltration Assessment 0 3/23/2017  3:00 PM   Dressing Status Clean, dry, & intact 3/23/2017  3:00 PM   Dressing Type Tape;Transparent 3/23/2017  3:00 PM   Hub Color/Line Status Green; Infusing 3/23/2017  3:00 PM   Action Taken Blood drawn 3/23/2017 10:12 AM        Opportunity for questions and clarification was provided.       Patient transported with:

## 2017-03-24 NOTE — PROGRESS NOTES
Patient ambulated to bathroom with no assistance needed. No c/o numbness/dizziness noted. Pt voided 100 ml urine. Epidural catheter removed. Will continue to monitor.

## 2017-03-24 NOTE — PROGRESS NOTES
Post-Partum Day Number 1 Progress Note    Mary Alex Krishna     Assessment: Doing well, post partum day 1    Plan:  1. Continue routine postpartum and perineal care as well as maternal education. 2. GDM- will need 2 hour gtt at [facility] visit    Information for the patient's : [address], [address] [[id number]]   Vaginal, Spontaneous Delivery   Patient doing well without significant complaint. Voiding without difficulty, normal lochia. Vitals:  Visit Vitals    /64 (BP 1 Location: Left arm, BP Patient Position: Sitting)    Pulse 83    Temp 98.6 °F (37 °C)    Resp 16    Ht 5' 7\" (1.702 m)    Wt 100.7 kg (222 lb)  Comment: pt told staff before the wrong weight of 122. she is 222.  SpO2 99%    Breastfeeding No    BMI 34.77 kg/m2     Temp (24hrs), Av.3 °F (36.8 °C), Min:97.6 °F (36.4 °C), Max:99.2 °F (37.3 °C)        Exam:   Patient without distress. Abdomen soft, fundus firm, nontender                Perineum with normal lochia noted. Lower extremities are negative for swelling, cords or tenderness.     Labs:     Lab Results   Component Value Date/Time    WBC 9.3 2017 10:23 AM    WBC 11.3 2012 09:45 AM    HGB 10.4 2017 10:23 AM    HGB 11.7 2012 09:45 AM    HCT 32.1 2017 10:23 AM    HCT 35.8 2012 09:45 AM    PLATELET 976  10:23 AM    PLATELET 813  09:45 AM       Recent Results (from the past 24 hour(s))   GLUCOSE, POC    Collection Time: 17 12:06 PM   Result Value Ref Range    Glucose (POC) 87 65 - 100 mg/dL    Performed by [facility] [employer] [address] [place] [address], POC    Collection Time: 17  2:14 PM   Result Value Ref Range    Glucose (POC) 79 65 - 100 mg/dL    Performed by [facility] [employer], POC    Collection Time: 17  4:02 PM   Result Value Ref Range    Glucose (POC) 85 65 - 100 mg/dL    Performed by [address], POC    Collection Time: 17  6:01 PM   Result Value Ref Range    Glucose (POC) 68 65 - 100 mg/dL    Performed by Michelle Escamilal

## 2017-03-24 NOTE — ROUTINE PROCESS
2120 TRANSFER - IN REPORT:    Verbal report received from SANCHO Villa RN(name) on [address]  being received from L&D(unit) for routine progression of care      Report consisted of patients Situation, Background, Assessment and   Recommendations(SBAR). Information from the following report(s) SBAR, Kardex, Intake/Output and MAR was reviewed with the receiving nurse. Opportunity for questions and clarification was provided. Assessment completed upon patients arrival to unit and care assumed.

## 2017-03-24 NOTE — ROUTINE PROCESS
2300 Bedside shift change report given to PATTIE Zacarias RN (oncoming nurse) by Kizzy Atkins. Fariba Mcclendon [facility] (offgoing nurse). Report included the following information SBAR, Kardex, Intake/Output and MAR.

## 2017-03-25 VITALS
TEMPERATURE: 98.1 F | DIASTOLIC BLOOD PRESSURE: 84 MMHG | BODY MASS INDEX: 34.84 KG/M2 | OXYGEN SATURATION: 99 % | HEART RATE: 99 BPM | RESPIRATION RATE: 18 BRPM | SYSTOLIC BLOOD PRESSURE: 124 MMHG | HEIGHT: 67 IN | WEIGHT: 222 LBS

## 2017-03-25 PROCEDURE — 74011250637 HC RX REV CODE- 250/637: Performed by: OBSTETRICS & GYNECOLOGY

## 2017-03-25 RX ORDER — IBUPROFEN 600 MG/1
600 TABLET ORAL
Qty: 30 TAB | Refills: 0 | Status: SHIPPED | OUTPATIENT
Start: 2017-03-25 | End: 2018-03-20

## 2017-03-25 RX ORDER — OXYCODONE AND ACETAMINOPHEN 5; 325 MG/1; MG/1
1-2 TABLET ORAL
Qty: 20 TAB | Refills: 0 | Status: SHIPPED | OUTPATIENT
Start: 2017-03-25 | End: 2020-11-17

## 2017-03-25 RX ADMIN — OXYCODONE HYDROCHLORIDE AND ACETAMINOPHEN 1 TABLET: 5; 325 TABLET ORAL at 07:54

## 2017-03-25 RX ADMIN — IBUPROFEN 800 MG: 400 TABLET, FILM COATED ORAL at 07:54

## 2017-03-25 NOTE — DISCHARGE INSTRUCTIONS
POST DELIVERY DISCHARGE INSTRUCTIONS    Name: Alton Kaba  YOB: 1990  Primary Diagnosis: Active Problems:    * No active hospital problems. *      General:     Diet/Diet Restrictions:  · Eight 8-ounce glasses of fluid daily (water, juices); avoid excessive caffeine intake. · Meals/snacks as desired which are high in fiber and carbohydrates and low in fat and cholesterol. Medications:         Physical Activity / Restrictions / Safety:     · Avoid heavy lifting, no more that 8 lbs. For 2-3 weeks;   · Limit use of stairs to 2 times daily for the first week home. · No driving for one week. · Avoid intercourse 4-6 weeks, no douching or tampon use. · Check with obstetrician before starting or resuming an exercise program.      Discharge Instructions/Special Treatment/Home Care Needs:     · Continue prenatal vitamins. · Continue to use squirt bottle with warm water on your episiotomy after each bathroom use until bleeding stops. · If steri-strips applied to your incision, remove in 7-10 days. Call your doctor for the following:     · Fever over 101 degrees by mouth. · Vaginal bleeding heavier than a normal menstrual period or clots larger than a golf ball. · Red streaks or increased swelling of legs, painful red streaks on your breast.  · Painful urination, constipation and increased pain or swelling or discharge with your incision. · If you feel extremely anxious or overwhelmed. · If you have thoughts of harming yourself and/or your baby. Pain Management:     · Take Acetaminophen (Tylenol) or Ibuprofen (Advil, Motrin), as directed for pain. · Use a warm Sitz bath 3 times daily to relieve episiotomy or hemorrhoidal discomfort. · For hemorrhoidal discomfort, use Tucks and Anusol cream as needed and directed. · Heating pad to  incision as needed.      Follow-Up Care:     Appointment with MD:   Follow-up Appointments   Procedures    FOLLOW UP VISIT Appointment in: 6 Weeks With Dr. Yazmin Muñoz for postpartum check and 2 hour gtt     With Dr. Yazmin Muñoz for postpartum check and 2 hour gtt     Standing Status:   Standing     Number of Occurrences:   1     Order Specific Question:   Appointment in     Answer:   Carmelo Lee     Telephone number: [phone]    Signed By: Ryder Hannah MD                                                                                                   Date: 3/25/2017 Time: 9:39 AM

## 2017-03-25 NOTE — DISCHARGE SUMMARY
Obstetrical Discharge Summary     Name: Venessa Marcus MRN: [id number]  SSN: [id number]    YOB: 1990  Age: 31 y.o. Sex: female      Admit Date: 3/23/2017    Discharge Date: 3/25/2017     Admitting Physician: Tho Awad MD     Attending Physician:  Melissa Ferrell MD     Admission Diagnoses: labor  Pregnancy    Discharge Diagnoses:   Information for the patient's : [address] [facility], [address] [place] [address] Drive [[id number]]   Delivery of a 4.15 kg female infant via Vaginal, Spontaneous Delivery on 3/23/2017 at 6:08 PM  by . Apgars were 9 and 9. Additional Diagnoses:    Lab Results   Component Value Date/Time    Rubella, External immune 2016    GrBStrep, External neg 2017       Hospital Course: Normal hospital course following the delivery. Disposition at Discharge: Home or self care    Discharged Condition: Stable    Patient Instructions:   Current Discharge Medication List      CONTINUE these medications which have CHANGED    Details   ibuprofen (MOTRIN) 600 mg tablet Take 1 Tab by mouth every six (6) hours as needed for Pain for up to 360 days. Qty: 30 Tab, Refills: 0      oxyCODONE-acetaminophen (PERCOCET) 5-325 mg per tablet Take 1-2 Tabs by mouth every four (4) hours as needed for Pain. Max Daily Amount: 12 Tabs. Qty: 20 Tab, Refills: 0         CONTINUE these medications which have NOT CHANGED    Details   valACYclovir (VALTREX) 500 mg tablet Take  by mouth two (2) times a day. Prenat MV-Iron-Ca-ZI-Cc2-SxpJJ -1 mg Cmpk Take  by mouth. STOP taking these medications       glyBURIDE (DIABETA) 5 mg tablet Comments:   Reason for Stopping:               Reference my discharge instructions.     Follow-up Appointments   Procedures    FOLLOW UP VISIT Appointment in: 6 Weeks With Dr. Richard Kent for postpartum check and 2 hour gtt     With Dr. Rihcard Kent for postpartum check and 2 hour gtt     Standing Status:   Standing     Number of Occurrences:   1     Order Specific Question:   Appointment in     Answer:   6 Weeks        Signed By:  Ibrahima Pereyra MD     March 25, 2017

## 2017-03-25 NOTE — PROGRESS NOTES
Discharge paperwork reviewed and signed. Copy given to mom and copy placed on the chart. Prescriptions given.

## 2017-03-25 NOTE — PROGRESS NOTES
Post-Partum Day Number 2 Progress Note    Mary Krishna     Assessment: Doing well, post partum day 2    Plan:   1. Discharge home today  2. Follow up in office in 6 weeks with Milady Campos MD  3. Post partum activity advised, diet as tolerated  4. Discharge Medications: ibuprofen, percocet and medications prior to admission  5. GDM- 2 hour gtt at postpartum visit    Information for the patient's : [address] [facility], [address] [[id number]]   Vaginal, Spontaneous Delivery   Patient doing well without significant complaint. Voiding without difficulty, normal lochia. Vitals:  Visit Vitals    /84 (BP 1 Location: Left arm, BP Patient Position: Sitting)    Pulse 99    Temp 98.1 °F (36.7 °C)    Resp 18    Ht 5' 7\" (1.702 m)    Wt 100.7 kg (222 lb)  Comment: pt told staff before the wrong weight of 122. she is 222.  SpO2 99%    Breastfeeding No    BMI 34.77 kg/m2     Temp (24hrs), Av.1 °F (36.7 °C), Min:97.9 °F (36.6 °C), Max:98.1 °F (36.7 °C)      Exam:         Patient without distress. Abdomen soft, fundus firm, nontender                 Lower extremities are negative for swelling, cords or tenderness. Labs:     Lab Results   Component Value Date/Time    WBC 9.3 2017 10:23 AM    WBC 11.3 2012 09:45 AM    HGB 10.4 2017 10:23 AM    HGB 11.7 2012 09:45 AM    HCT 32.1 2017 10:23 AM    HCT 35.8 2012 09:45 AM    PLATELET 898  10:23 AM    PLATELET 914  09:45 AM       No results found for this or any previous visit (from the past 24 hour(s)).

## 2017-03-25 NOTE — ROUTINE PROCESS
Bedside shift change report given to JUSTO Santo RN by Dejuan Lara. Richie Diaz. Report given with SBAR.

## 2020-11-17 ENCOUNTER — OFFICE VISIT (OUTPATIENT)
Dept: FAMILY MEDICINE CLINIC | Age: 30
End: 2020-11-17
Payer: COMMERCIAL

## 2020-11-17 VITALS
TEMPERATURE: 97 F | BODY MASS INDEX: 31.23 KG/M2 | RESPIRATION RATE: 16 BRPM | OXYGEN SATURATION: 97 % | HEIGHT: 67 IN | SYSTOLIC BLOOD PRESSURE: 115 MMHG | WEIGHT: 199 LBS | DIASTOLIC BLOOD PRESSURE: 75 MMHG | HEART RATE: 91 BPM

## 2020-11-17 DIAGNOSIS — Z13.6 SCREENING FOR CARDIOVASCULAR CONDITION: Primary | ICD-10-CM

## 2020-11-17 DIAGNOSIS — Z11.3 SCREENING FOR STD (SEXUALLY TRANSMITTED DISEASE): ICD-10-CM

## 2020-11-17 DIAGNOSIS — Z13.1 SCREENING FOR DIABETES MELLITUS: ICD-10-CM

## 2020-11-17 DIAGNOSIS — Z00.00 ANNUAL PHYSICAL EXAM: ICD-10-CM

## 2020-11-17 LAB
ALBUMIN SERPL-MCNC: 4.2 G/DL (ref 3.5–5)
ALBUMIN/GLOB SERPL: 1.2 {RATIO} (ref 1.1–2.2)
ALP SERPL-CCNC: 64 U/L (ref 45–117)
ALT SERPL-CCNC: 16 U/L (ref 12–78)
ANION GAP SERPL CALC-SCNC: 5 MMOL/L (ref 5–15)
APPEARANCE UR: CLEAR
AST SERPL-CCNC: 11 U/L (ref 15–37)
BASOPHILS # BLD: 0 K/UL (ref 0–0.1)
BASOPHILS NFR BLD: 0 % (ref 0–1)
BILIRUB SERPL-MCNC: 0.3 MG/DL (ref 0.2–1)
BILIRUB UR QL: NEGATIVE
BUN SERPL-MCNC: 13 MG/DL (ref 6–20)
BUN/CREAT SERPL: 15 (ref 12–20)
CALCIUM SERPL-MCNC: 8.8 MG/DL (ref 8.5–10.1)
CHLORIDE SERPL-SCNC: 111 MMOL/L (ref 97–108)
CHOLEST SERPL-MCNC: 210 MG/DL
CO2 SERPL-SCNC: 25 MMOL/L (ref 21–32)
COLOR UR: NORMAL
CREAT SERPL-MCNC: 0.89 MG/DL (ref 0.55–1.02)
DIFFERENTIAL METHOD BLD: ABNORMAL
EOSINOPHIL # BLD: 0 K/UL (ref 0–0.4)
EOSINOPHIL NFR BLD: 0 % (ref 0–7)
ERYTHROCYTE [DISTWIDTH] IN BLOOD BY AUTOMATED COUNT: 13.2 % (ref 11.5–14.5)
GLOBULIN SER CALC-MCNC: 3.5 G/DL (ref 2–4)
GLUCOSE SERPL-MCNC: 89 MG/DL (ref 65–100)
GLUCOSE UR STRIP.AUTO-MCNC: NEGATIVE MG/DL
HAV IGM SER QL: NONREACTIVE
HBV CORE IGM SER QL: NONREACTIVE
HBV SURFACE AG SER QL: <0.1 INDEX
HBV SURFACE AG SER QL: NEGATIVE
HCT VFR BLD AUTO: 39.7 % (ref 35–47)
HCV AB SERPL QL IA: NONREACTIVE
HCV COMMENT,HCGAC: NORMAL
HDLC SERPL-MCNC: 44 MG/DL
HDLC SERPL: 4.8 {RATIO} (ref 0–5)
HGB BLD-MCNC: 12.5 G/DL (ref 11.5–16)
HGB UR QL STRIP: NEGATIVE
HIV 1+2 AB+HIV1 P24 AG SERPL QL IA: NONREACTIVE
HIV12 RESULT COMMENT, HHIVC: NORMAL
IMM GRANULOCYTES # BLD AUTO: 0 K/UL (ref 0–0.04)
IMM GRANULOCYTES NFR BLD AUTO: 0 % (ref 0–0.5)
KETONES UR QL STRIP.AUTO: NEGATIVE MG/DL
LDLC SERPL CALC-MCNC: 148.4 MG/DL (ref 0–100)
LEUKOCYTE ESTERASE UR QL STRIP.AUTO: NEGATIVE
LIPID PROFILE,FLP: ABNORMAL
LYMPHOCYTES # BLD: 1.5 K/UL (ref 0.8–3.5)
LYMPHOCYTES NFR BLD: 25 % (ref 12–49)
MCH RBC QN AUTO: 27.4 PG (ref 26–34)
MCHC RBC AUTO-ENTMCNC: 31.5 G/DL (ref 30–36.5)
MCV RBC AUTO: 87.1 FL (ref 80–99)
MONOCYTES # BLD: 0.2 K/UL (ref 0–1)
MONOCYTES NFR BLD: 4 % (ref 5–13)
NEUTS SEG # BLD: 4.3 K/UL (ref 1.8–8)
NEUTS SEG NFR BLD: 71 % (ref 32–75)
NITRITE UR QL STRIP.AUTO: NEGATIVE
NRBC # BLD: 0 K/UL (ref 0–0.01)
NRBC BLD-RTO: 0 PER 100 WBC
PH UR STRIP: 5 [PH] (ref 5–8)
PLATELET # BLD AUTO: 293 K/UL (ref 150–400)
PMV BLD AUTO: 10.1 FL (ref 8.9–12.9)
POTASSIUM SERPL-SCNC: 4.2 MMOL/L (ref 3.5–5.1)
PROT SERPL-MCNC: 7.7 G/DL (ref 6.4–8.2)
PROT UR STRIP-MCNC: NEGATIVE MG/DL
RBC # BLD AUTO: 4.56 M/UL (ref 3.8–5.2)
SODIUM SERPL-SCNC: 141 MMOL/L (ref 136–145)
SP GR UR REFRACTOMETRY: 1.02 (ref 1–1.03)
SP1: NORMAL
SP2: NORMAL
SP3: NORMAL
TRIGL SERPL-MCNC: 88 MG/DL (ref ?–150)
UROBILINOGEN UR QL STRIP.AUTO: 0.2 EU/DL (ref 0.2–1)
VLDLC SERPL CALC-MCNC: 17.6 MG/DL
WBC # BLD AUTO: 6.1 K/UL (ref 3.6–11)

## 2020-11-17 PROCEDURE — 99385 PREV VISIT NEW AGE 18-39: CPT | Performed by: FAMILY MEDICINE

## 2020-11-17 RX ORDER — NORETHINDRONE ACETATE AND ETHINYL ESTRADIOL, ETHINYL ESTRADIOL AND FERROUS FUMARATE 1MG-10(24)
KIT ORAL
COMMUNITY
Start: 2020-11-05 | End: 2022-06-21

## 2020-11-17 NOTE — PROGRESS NOTES
Aleksandr Ward is a 31 y.o. female who presents today for her annual checkup    LMP: 11/6/2020. Menses: Regular. G*P*. Last pelvic/PAP: 2 weeks ago at OB[facility]N, [facility]. Last mammogram: Never. Last BMD: Never. Last screening colonoscopy: Never. Trying to eat a well balanced diet. There is no immunization history for the selected administration types on file for this patient., There is no immunization history for the selected administration types on file for this patient. ,       Current Outpatient Medications   Medication Sig Dispense Refill    Lo Loestrin Fe 1 mg-10 mcg (24)/10 mcg (2) tab TAKE 1 TABLET BY MOUTH EVERY DAY      valACYclovir (VALTREX) 500 mg tablet Take  by mouth two (2) times a day. Allergies: Patient has no known allergies.    Social History     Socioeconomic History    Marital status: SINGLE     Spouse name: Not on file    Number of children: Not on file    Years of education: Not on file    Highest education level: Not on file   Occupational History    Not on file   Social Needs    Financial resource strain: Not on file    Food insecurity     Worry: Not on file     Inability: Not on file    Transportation needs     Medical: Not on file     Non-medical: Not on file   Tobacco Use    Smoking status: Never Smoker    Smokeless tobacco: Never Used   Substance and Sexual Activity    Alcohol use: No    Drug use: No    Sexual activity: Yes     Partners: Male   Lifestyle    Physical activity     Days per week: Not on file     Minutes per session: Not on file    Stress: Not on file   Relationships    Social connections     Talks on phone: Not on file     Gets together: Not on file     Attends Sikh service: Not on file     Active member of club or organization: Not on file     Attends meetings of clubs or organizations: Not on file     Relationship status: Not on file    Intimate partner violence     Fear of current or ex partner: Not on file Emotionally abused: Not on file     Physically abused: Not on file     Forced sexual activity: Not on file   Other Topics Concern    Not on file   Social History Narrative    Not on file     Family History   Problem Relation Age of Onset    Hypertension Mother      Past Medical History:   Diagnosis Date    Genital herpes, unspecified     HX OTHER MEDICAL     h/o fibroids         Review of Systems   Constitutional: Negative. HENT: Negative. Eyes: Negative. Respiratory: Negative. Cardiovascular: Negative. Gastrointestinal: Negative. Genitourinary: Negative. Musculoskeletal: Negative. Skin: Negative. Neurological: Negative. Endo/Heme/Allergies: Negative. Psychiatric/Behavioral: Negative. Physical Exam  Constitutional:       Appearance: Normal appearance. HENT:      Right Ear: Tympanic membrane, ear canal and external ear normal.      Left Ear: Tympanic membrane, ear canal and external ear normal.   Eyes:      Extraocular Movements: Extraocular movements intact. Conjunctiva/sclera: Conjunctivae normal.      Pupils: Pupils are equal, round, and reactive to light. Neck:      Musculoskeletal: Normal range of motion and neck supple. Thyroid: No thyromegaly. Cardiovascular:      Rate and Rhythm: Normal rate and regular rhythm. Pulmonary:      Effort: Pulmonary effort is normal.      Breath sounds: Normal breath sounds. Abdominal:      General: Bowel sounds are normal. There is no distension. Palpations: Abdomen is soft. Tenderness: There is no abdominal tenderness. Musculoskeletal: Normal range of motion. Right lower leg: No edema. Left lower leg: No edema. Lymphadenopathy:      Cervical: No cervical adenopathy. Skin:     General: Skin is warm and dry. Neurological:      General: No focal deficit present. Mental Status: She is alert and oriented to person, place, and time. Mental status is at baseline.    Psychiatric: Mood and Affect: Mood normal.         Behavior: Behavior normal.            Assessment/plan    1. Screening for cardiovascular condition    - LIPID PANEL; Future  - LIPID PANEL    2. Screening for diabetes mellitus    - METABOLIC PANEL, COMPREHENSIVE; Future  - METABOLIC PANEL, COMPREHENSIVE    3. Screening for STD (sexually transmitted disease)    - HIV 1/2 AG/AB, 4TH GENERATION,W RFLX CONFIRM; Future  - HSV 1 AND 2-SPEC AB, IGG W/RFX TO SUPPL HSV-2 TESTING; Future  - HEPATITIS PANEL, ACUTE; Future  - T PALLIDUM SCREEN W/REFLEX; Future  - T PALLIDUM SCREEN W/REFLEX  - HEPATITIS PANEL, ACUTE  - HSV 1 AND 2-SPEC AB, IGG W/RFX TO SUPPL HSV-2 TESTING  - HIV 1/2 AG/AB, 4TH GENERATION,W RFLX CONFIRM    4. Annual physical exam  Declined flu and tetanus shots today. - HIV 1/2 AG/AB, 4TH GENERATION,W RFLX CONFIRM; Future  - HSV 1 AND 2-SPEC AB, IGG W/RFX TO SUPPL HSV-2 TESTING; Future  - HEPATITIS PANEL, ACUTE; Future  - T PALLIDUM SCREEN W/REFLEX; Future  - THYROID CASCADE PROFILE; Future  - METABOLIC PANEL, COMPREHENSIVE; Future  - CBC WITH AUTOMATED DIFF; Future  - URINALYSIS W/ RFLX MICROSCOPIC; Future  - LIPID PANEL; Future  - LIPID PANEL  - URINALYSIS W/ RFLX MICROSCOPIC  - CBC WITH AUTOMATED DIFF  - METABOLIC PANEL, COMPREHENSIVE  - THYROID CASCADE PROFILE  - T PALLIDUM SCREEN W/REFLEX  - HEPATITIS PANEL, ACUTE  - HSV 1 AND 2-SPEC AB, IGG W/RFX TO SUPPL HSV-2 TESTING  - HIV 1/2 AG/AB, 4TH GENERATION,W RFLX CONFIRM        31 y.o. female who presents today for annual exam. UTD on screening. Not UTD on vaccines. Will check routine labs. Encouraged daily exercise and trying to eat a well balanced diet. I have discussed the diagnosis with the patient and the intended plan as seen in the above orders. The patient has received an after-visit summary and questions were answered concerning future plans. I have discussed medication side effects and warnings with the patient as well.  The patient agrees and understands above plan.     Follow-up and Dispositions    · Return in about 1 year (around 11/17/2021) for Imani Mcgee MD

## 2020-11-17 NOTE — PROGRESS NOTES
Patient stated name &     Chief Complaint   Patient presents with    New Patient     Complete Physical    Has a GYN, went last Friday        Health Maintenance Due   Topic    DTaP/Tdap/Td series (1 - Tdap)    PAP AKA CERVICAL CYTOLOGY     Flu Vaccine (1)       Wt Readings from Last 3 Encounters:   20 199 lb (90.3 kg)   17 222 lb (100.7 kg)   12 200 lb (90.7 kg)     Temp Readings from Last 3 Encounters:   20 97 °F (36.1 °C) (Oral)   17 98.1 °F (36.7 °C)   12 98 °F (36.7 °C)     BP Readings from Last 3 Encounters:   20 115/75   17 124/84   12 121/75     Pulse Readings from Last 3 Encounters:   20 91   17 99   12 90         Learning Assessment:  :     No flowsheet data found. Depression Screening:  :     3 most recent PHQ Screens 2020   Little interest or pleasure in doing things Not at all   Feeling down, depressed, irritable, or hopeless Not at all   Total Score PHQ 2 0       Fall Risk Assessment:  :     No flowsheet data found. Abuse Screening:  :     No flowsheet data found. Coordination of Care Questionnaire:  :     1) Have you been to an emergency room, urgent care clinic since your last visit? No    Hospitalized since your last visit? No             2) Have you seen or consulted any other health care providers outside of [address] [place] [address] since your last visit? No  (Include any pap smears or colon screenings in this section.)    Patient is accompanied by self I have received verbal consent from [facility] to discuss any/all medical information while they are present in the room.

## 2020-11-18 LAB — TSH SERPL-ACNC: 0.74 UIU/ML (ref 0.45–4.5)

## 2020-11-19 LAB
HSV1 IGG SER IA-ACNC: <0.91 INDEX (ref 0–0.9)
HSV2 IGG SER IA-ACNC: >23.6 INDEX (ref 0–0.9)
T PALLIDUM AB SER QL IA: NON REACTIVE

## 2020-12-09 ENCOUNTER — TELEPHONE (OUTPATIENT)
Dept: FAMILY MEDICINE CLINIC | Age: 30
End: 2020-12-09

## 2020-12-09 NOTE — TELEPHONE ENCOUNTER
----- Message from Negin You sent at 12/8/2020  3:40 PM EST -----  Regarding: Dr. Jose Mathews / Skye Carter first and last name: N/A  Reason for call: Pt is requesting results about lab results from last visit on 11/17/2020. Callback required yes/no and why: Yes, to discuss results.    Best contact number(s): [phone]  Details to clarify the request: N/A

## 2020-12-14 ENCOUNTER — VIRTUAL VISIT (OUTPATIENT)
Dept: FAMILY MEDICINE CLINIC | Age: 30
End: 2020-12-14
Payer: COMMERCIAL

## 2020-12-14 DIAGNOSIS — B00.9 HSV-2 (HERPES SIMPLEX VIRUS 2) INFECTION: Primary | ICD-10-CM

## 2020-12-14 DIAGNOSIS — E78.5 HYPERLIPIDEMIA, UNSPECIFIED HYPERLIPIDEMIA TYPE: ICD-10-CM

## 2020-12-14 PROCEDURE — 99213 OFFICE O/P EST LOW 20 MIN: CPT | Performed by: FAMILY MEDICINE

## 2020-12-14 NOTE — PROGRESS NOTES
Patient stated name &   Chief Complaint   Patient presents with   [facility]     Results        Health Maintenance Due   Topic    DTaP/Tdap/Td series (1 - Tdap)    PAP AKA CERVICAL CYTOLOGY     Flu Vaccine (1)       Wt Readings from Last 3 Encounters:   20 199 lb (90.3 kg)   17 222 lb (100.7 kg)   12 200 lb (90.7 kg)     Temp Readings from Last 3 Encounters:   20 97 °F (36.1 °C) (Oral)   17 98.1 °F (36.7 °C)   12 98 °F (36.7 °C)     BP Readings from Last 3 Encounters:   20 115/75   17 124/84   12 121/75     Pulse Readings from Last 3 Encounters:   20 91   17 99   12 90         Learning Assessment:  :     No flowsheet data found. Depression Screening:  :     3 most recent PHQ Screens 2020   Little interest or pleasure in doing things Not at all   Feeling down, depressed, irritable, or hopeless Not at all   Total Score PHQ 2 0       Fall Risk Assessment:  :     No flowsheet data found. Abuse Screening:  :     No flowsheet data found. Coordination of Care Questionnaire:  :     1) Have you been to an emergency room, urgent care clinic since your last visit? No    Hospitalized since your last visit? No             2) Have you seen or consulted any other health care providers outside of [address] [place] [address] since your last visit? No  (Include any pap smears or colon screenings in this section.)    Patient is accompanied by VV I have received verbal consent from [facility] to discuss any/all medical information while they are present in the room.

## 2020-12-14 NOTE — PROGRESS NOTES
Consent: Tran Linder, who was seen by synchronous (real-time) audio-video technology, and/or her healthcare decision maker, is aware that this patient-initiated, Telehealth encounter on 12/14/2020 is a billable service, with coverage as determined by her insurance carrier. She is aware that she may receive a bill and has provided verbal consent to proceed: Yes. Assessment & Plan:   Diagnoses and all orders for this visit:    1. HSV-2 (herpes simplex virus 2) infection    2. Hyperlipidemia, unspecified hyperlipidemia type    All recent lab results were discussed with patient in detail. Follow-up and Dispositions    · Return in about 6 months (around 6/14/2021) for follow up. I ADVISED PATIENT TO GO TO ER IF SYMPTOMS WORSEN , CHANGE OR FAILS TO IMPROVE. I spent at least 15 minutes with this established patient, and >50% of the time was spent counseling and/or coordinating care regarding SEE BELOW  712  Subjective:   Tran Linder is a 31 y.o. female who was seen for Labs (Results)      1. HSV-2 (herpes simplex virus 2) infection  Only taking Valtrex as needed. Reports never had genital herpes outbreak. Reports only had cold sore on the lip and was taking Valtrex for this. 2. Hyperlipidemia, unspecified hyperlipidemia type  Not on cholesterol medication at this time. We discussed lab results. Advised to start low-fat diet and 30 minutes exercise daily. Lab Results   Component Value Date/Time    Cholesterol, total 210 (H) 11/17/2020 12:09 PM    HDL Cholesterol 44 11/17/2020 12:09 PM    LDL, calculated 148.4 (H) 11/17/2020 12:09 PM    VLDL, calculated 17.6 11/17/2020 12:09 PM    Triglyceride 88 11/17/2020 12:09 PM    CHOL/HDL Ratio 4.8 11/17/2020 12:09 PM         Prior to Admission medications    Medication Sig Start Date End Date Taking?  Authorizing Provider   Lo Loestrin Fe 1 mg-10 mcg (24)/10 mcg (2) tab TAKE 1 TABLET BY MOUTH EVERY DAY 11/5/20  Yes Provider, Historical valACYclovir (VALTREX) 500 mg tablet Take  by mouth two (2) times a day. Provider, Historical     No Known Allergies    Patient Active Problem List   Diagnosis Code   (none) - all problems resolved or deleted     There are no active problems to display for this patient. Current Outpatient Medications   Medication Sig Dispense Refill    Lo Loestrin Fe 1 mg-10 mcg (24)/10 mcg (2) tab TAKE 1 TABLET BY MOUTH EVERY DAY      valACYclovir (VALTREX) 500 mg tablet Take  by mouth two (2) times a day. No Known Allergies  Past Medical History:   Diagnosis Date    Genital herpes, unspecified     HX OTHER MEDICAL     h/o fibroids     Past Surgical History:   Procedure Laterality Date    HX OTHER SURGICAL      eye surgery as infant     Family History   Problem Relation Age of Onset    Hypertension Mother      Social History     Tobacco Use    Smoking status: Never Smoker    Smokeless tobacco: Never Used   Substance Use Topics    Alcohol use: No       Review of Systems   Constitutional: Negative. HENT: Negative. Eyes: Negative. Respiratory: Negative. Cardiovascular: Negative. Gastrointestinal: Negative. Genitourinary: Negative. Musculoskeletal: Negative. Skin: Negative. Neurological: Negative. Endo/Heme/Allergies: Negative. Psychiatric/Behavioral: Negative. Objective:     No flowsheet data found.      [INSTRUCTIONS:  \"[x]\" Indicates a positive item  \"[]\" Indicates a negative item  -- DELETE ALL ITEMS NOT EXAMINED]    Constitutional: [x] Appears well-developed and well-nourished [x] No apparent distress      [] Abnormal -     Mental status: [x] Alert and awake  [x] Oriented to person/place/time [x] Able to follow commands    [] Abnormal -     Eyes:   EOM    [x]  Normal    [] Abnormal -   Sclera  [x]  Normal    [] Abnormal -          Discharge [x]  None visible   [] Abnormal -     HENT: [x] Normocephalic, atraumatic  [] Abnormal -   [x] Mouth/Throat: Mucous membranes are moist    External Ears [x] Normal  [] Abnormal -    Neck: [x] No visualized mass [] Abnormal -     Pulmonary/Chest: [x] Respiratory effort normal   [x] No visualized signs of difficulty breathing or respiratory distress        [] Abnormal -      Musculoskeletal:   [x] Normal gait with no signs of ataxia         [x] Normal range of motion of neck        [] Abnormal -     Neurological:        [x] No Facial Asymmetry (Cranial nerve 7 motor function) (limited exam due to video visit)          [x] No gaze palsy        [] Abnormal -          Skin:        [x] No significant exanthematous lesions or discoloration noted on facial skin         [] Abnormal -            Psychiatric:       [x] Normal Affect [] Abnormal -        [x] No Hallucinations    Other pertinent observable physical exam findings:-    We discussed the expected course, resolution and complications of the diagnosis(es) in detail. Medication risks, benefits, costs, interactions, and alternatives were discussed as indicated. I advised her to contact the office if her condition worsens, changes or fails to improve as anticipated. She expressed understanding with the diagnosis(es) and plan. Cyndi Dc is a 31 y.o. female being evaluated by a video visit encounter for concerns as above. A caregiver was present when appropriate. Due to this being a TeleHealth encounter (During LIYRT-74 public health emergency), evaluation of the following organ systems was limited: Vitals/Constitutional/EENT/Resp/CV/GI//MS/Neuro/Skin/Heme-Lymph-Imm. Pursuant to the emergency declaration under the [facility] waiver authority and the [employer] General Act, this Virtual  Visit was conducted, with patient's (and/or legal guardian's) consent, to reduce the patient's risk of exposure to COVID-19 and provide necessary medical care.      Services were provided through a video synchronous discussion virtually to substitute for in-person clinic visit. Patient was located at her home. I was at office while conducting this encounter.        Sravanthi Gomez MD

## 2022-06-14 ENCOUNTER — TELEPHONE (OUTPATIENT)
Dept: FAMILY MEDICINE CLINIC | Age: 32
End: 2022-06-14

## 2022-06-14 NOTE — TELEPHONE ENCOUNTER
----- Message from Stephanie Wilder sent at 6/13/2022  8:38 AM EDT -----  Subject: Appointment Request    Reason for Call: Semi-Routine Skin Problem    QUESTIONS  Type of Appointment? Established Patient  Reason for appointment request? No appointments available during search  Additional Information for Provider? Needs appointment for red marks on   skin. Last seen 12/14/2020 with Dr. Trino Hargrove. Please call to schedule.  ---------------------------------------------------------------------------  --------------  CALL BACK INFO  What is the best way for the office to contact you? OK to leave message on   voicemail  Preferred Call Back Phone Number? [phone]  ---------------------------------------------------------------------------  --------------  SCRIPT ANSWERS  Relationship to Patient? Self  Specialty Confirmation? Primary Care  Are you having swelling in your throat or face? No  Are you having difficulty breathing? No  Have the symptoms worsened or spread in the last day? No  Are you having fevers (100.4), chills or sweats? No  Have you recently (14 days) seen a provider for this issue? No  Have you been diagnosed with COVID-19 in the past 10 days? No  (Service Expert  click yes below to proceed with [employer] As Usual   Scheduling)?  Yes

## 2022-06-21 ENCOUNTER — OFFICE VISIT (OUTPATIENT)
Dept: FAMILY MEDICINE CLINIC | Age: 32
End: 2022-06-21
Payer: COMMERCIAL

## 2022-06-21 ENCOUNTER — TELEPHONE (OUTPATIENT)
Dept: FAMILY MEDICINE CLINIC | Age: 32
End: 2022-06-21

## 2022-06-21 VITALS
RESPIRATION RATE: 20 BRPM | BODY MASS INDEX: 32.36 KG/M2 | OXYGEN SATURATION: 100 % | HEIGHT: 67 IN | TEMPERATURE: 97.6 F | SYSTOLIC BLOOD PRESSURE: 117 MMHG | WEIGHT: 206.2 LBS | DIASTOLIC BLOOD PRESSURE: 79 MMHG | HEART RATE: 76 BPM

## 2022-06-21 DIAGNOSIS — R21 RASH AND NONSPECIFIC SKIN ERUPTION: ICD-10-CM

## 2022-06-21 DIAGNOSIS — Z00.00 ANNUAL PHYSICAL EXAM: Primary | ICD-10-CM

## 2022-06-21 DIAGNOSIS — Z12.39 SCREENING BREAST EXAMINATION: ICD-10-CM

## 2022-06-21 DIAGNOSIS — B36.0 TINEA VERSICOLOR: ICD-10-CM

## 2022-06-21 DIAGNOSIS — E78.5 HYPERLIPIDEMIA, UNSPECIFIED HYPERLIPIDEMIA TYPE: ICD-10-CM

## 2022-06-21 DIAGNOSIS — Z13.21 ENCOUNTER FOR VITAMIN DEFICIENCY SCREENING: ICD-10-CM

## 2022-06-21 LAB
25(OH)D3 SERPL-MCNC: 13.4 NG/ML (ref 30–100)
ALBUMIN SERPL-MCNC: 4.1 G/DL (ref 3.5–5)
ALBUMIN/GLOB SERPL: 1.1 {RATIO} (ref 1.1–2.2)
ALP SERPL-CCNC: 83 U/L (ref 45–117)
ALT SERPL-CCNC: 22 U/L (ref 12–78)
AMORPH CRY URNS QL MICRO: ABNORMAL
ANION GAP SERPL CALC-SCNC: 5 MMOL/L (ref 5–15)
APPEARANCE UR: ABNORMAL
AST SERPL-CCNC: 11 U/L (ref 15–37)
BACTERIA URNS QL MICRO: ABNORMAL /HPF
BASOPHILS # BLD: 0 K/UL (ref 0–0.1)
BASOPHILS NFR BLD: 0 % (ref 0–1)
BILIRUB SERPL-MCNC: 0.4 MG/DL (ref 0.2–1)
BILIRUB UR QL CFM: NEGATIVE
BUN SERPL-MCNC: 15 MG/DL (ref 6–20)
BUN/CREAT SERPL: 17 (ref 12–20)
CALCIUM SERPL-MCNC: 9.7 MG/DL (ref 8.5–10.1)
CHLORIDE SERPL-SCNC: 106 MMOL/L (ref 97–108)
CHOLEST SERPL-MCNC: 202 MG/DL
CO2 SERPL-SCNC: 28 MMOL/L (ref 21–32)
COLOR UR: ABNORMAL
CREAT SERPL-MCNC: 0.88 MG/DL (ref 0.55–1.02)
DIFFERENTIAL METHOD BLD: NORMAL
EOSINOPHIL # BLD: 0.1 K/UL (ref 0–0.4)
EOSINOPHIL NFR BLD: 1 % (ref 0–7)
EPITH CASTS URNS QL MICRO: ABNORMAL /LPF
ERYTHROCYTE [DISTWIDTH] IN BLOOD BY AUTOMATED COUNT: 13.1 % (ref 11.5–14.5)
GLOBULIN SER CALC-MCNC: 3.9 G/DL (ref 2–4)
GLUCOSE SERPL-MCNC: 111 MG/DL (ref 65–100)
GLUCOSE UR STRIP.AUTO-MCNC: 250 MG/DL
HCT VFR BLD AUTO: 43.1 % (ref 35–47)
HDLC SERPL-MCNC: 53 MG/DL
HDLC SERPL: 3.8 {RATIO} (ref 0–5)
HGB BLD-MCNC: 13.4 G/DL (ref 11.5–16)
HGB UR QL STRIP: ABNORMAL
IMM GRANULOCYTES # BLD AUTO: 0 K/UL (ref 0–0.04)
IMM GRANULOCYTES NFR BLD AUTO: 0 % (ref 0–0.5)
KETONES UR QL STRIP.AUTO: ABNORMAL MG/DL
LDLC SERPL CALC-MCNC: 128.6 MG/DL (ref 0–100)
LEUKOCYTE ESTERASE UR QL STRIP.AUTO: ABNORMAL
LYMPHOCYTES # BLD: 2.3 K/UL (ref 0.8–3.5)
LYMPHOCYTES NFR BLD: 27 % (ref 12–49)
MCH RBC QN AUTO: 27.4 PG (ref 26–34)
MCHC RBC AUTO-ENTMCNC: 31.1 G/DL (ref 30–36.5)
MCV RBC AUTO: 88.1 FL (ref 80–99)
MONOCYTES # BLD: 0.4 K/UL (ref 0–1)
MONOCYTES NFR BLD: 5 % (ref 5–13)
NEUTS SEG # BLD: 5.6 K/UL (ref 1.8–8)
NEUTS SEG NFR BLD: 67 % (ref 32–75)
NITRITE UR QL STRIP.AUTO: POSITIVE
NRBC # BLD: 0 K/UL (ref 0–0.01)
NRBC BLD-RTO: 0 PER 100 WBC
PH UR STRIP: 6.5 [PH] (ref 5–8)
PLATELET # BLD AUTO: 350 K/UL (ref 150–400)
PMV BLD AUTO: 9.7 FL (ref 8.9–12.9)
POTASSIUM SERPL-SCNC: 3.9 MMOL/L (ref 3.5–5.1)
PROT SERPL-MCNC: 8 G/DL (ref 6.4–8.2)
PROT UR STRIP-MCNC: >300 MG/DL
RBC # BLD AUTO: 4.89 M/UL (ref 3.8–5.2)
RBC #/AREA URNS HPF: >100 /HPF (ref 0–5)
SODIUM SERPL-SCNC: 139 MMOL/L (ref 136–145)
SP GR UR REFRACTOMETRY: 1.03 (ref 1–1.03)
TRIGL SERPL-MCNC: 102 MG/DL (ref ?–150)
UA: UC IF INDICATED,UAUC: ABNORMAL
UROBILINOGEN UR QL STRIP.AUTO: 2 EU/DL (ref 0.2–1)
VLDLC SERPL CALC-MCNC: 20.4 MG/DL
WBC # BLD AUTO: 8.5 K/UL (ref 3.6–11)
WBC URNS QL MICRO: ABNORMAL /HPF (ref 0–4)

## 2022-06-21 PROCEDURE — 99395 PREV VISIT EST AGE 18-39: CPT | Performed by: FAMILY MEDICINE

## 2022-06-21 RX ORDER — KETOCONAZOLE 20 MG/ML
SHAMPOO TOPICAL
Qty: 120 ML | Refills: 1 | Status: SHIPPED | OUTPATIENT
Start: 2022-06-21

## 2022-06-21 RX ORDER — MINERAL OIL
180 ENEMA (ML) RECTAL
Qty: 30 TABLET | Refills: 0 | Status: SHIPPED | OUTPATIENT
Start: 2022-06-21

## 2022-06-21 NOTE — PROGRESS NOTES
Roxanna Yanez is a 34 y.o. female who presents today for her annual checkup    LMP: 6/21/2022. Menses: Regular. Last pelvic/PAP: Done by OB/GYN. Last mammogram: Never. Last BMD: Never. Last screening colonoscopy: Never. Trying to eat a well balanced diet. There is no immunization history for the selected administration types on file for this patient., There is no immunization history for the selected administration types on file for this patient. ,       Current Outpatient Medications   Medication Sig Dispense Refill    ketoconazole (NIZORAL) 2 % shampoo Apply to affected area of damp skin, lather, leave on 5 minutes, and rinse 120 mL 1    fexofenadine (ALLEGRA) 180 mg tablet Take 1 Tablet by mouth daily as needed for Allergies. 30 Tablet 0     Allergies: Patient has no known allergies. Social History     Socioeconomic History    Marital status: SINGLE     Spouse name: Not on file    Number of children: Not on file    Years of education: Not on file    Highest education level: Not on file   Occupational History    Not on file   Tobacco Use    Smoking status: Never Smoker    Smokeless tobacco: Never Used   Vaping Use    Vaping Use: Never used   Substance and Sexual Activity    Alcohol use: No    Drug use: No    Sexual activity: Yes     Partners: Male   Other Topics Concern    Not on file   Social History Narrative    Not on file     Social Determinants of Health     Financial Resource Strain:     Difficulty of Paying Living Expenses: Not on file   Food Insecurity:     Worried About Running Out of Food in the Last Year: Not on file    [place] of Food in the Last Year: Not on file   Transportation Needs:     Lack of Transportation (Medical): Not on file    Lack of Transportation (Non-Medical):  Not on file   Physical Activity:     Days of Exercise per Week: Not on file    Minutes of Exercise per Session: Not on file   Stress:     Feeling of Stress : Not on file   Social Connections:  Frequency of Communication with Friends and Family: Not on file    Frequency of Social Gatherings with Friends and Family: Not on file    Attends Mormonism Services: Not on file    Active Member of Clubs or Organizations: Not on file    Attends Club or Organization Meetings: Not on file    Marital Status: Not on file   Intimate Partner Violence:     Fear of Current or Ex-Partner: Not on file    Emotionally Abused: Not on file    Physically Abused: Not on file    Sexually Abused: Not on file   Housing Stability:     Unable to Pay for Housing in the Last Year: Not on file    Number of [place] in the Last Year: Not on file    Unstable Housing in the Last Year: Not on file     Family History   Problem Relation Age of Onset    Hypertension Mother      Past Medical History:   Diagnosis Date    Genital herpes, unspecified     HX OTHER MEDICAL     h/o fibroids       Problem List  Date Reviewed: 6/21/2022    None          Patient Care Team:  Rosendo Cee MD as PCP - General (Family Medicine)  Rosendo Cee MD as PCP - [address] [place] [address] Provider  Sandi Montgomery MD (Obstetrics & Gynecology)    Review of Systems   Constitutional: Negative. HENT: Negative. Eyes: Negative. Respiratory: Negative. Cardiovascular: Negative. Gastrointestinal: Negative. Genitourinary: Negative. Musculoskeletal: Negative. Skin: Positive for rash. Negative for itching. Neurological: Negative. Endo/Heme/Allergies: Negative. Psychiatric/Behavioral: Negative. Physical Exam  Exam conducted with a chaperone present. Constitutional:       Appearance: Normal appearance. HENT:      Right Ear: Tympanic membrane, ear canal and external ear normal.      Left Ear: Tympanic membrane, ear canal and external ear normal.   Eyes:      Extraocular Movements: Extraocular movements intact. Conjunctiva/sclera: Conjunctivae normal.      Pupils: Pupils are equal, round, and reactive to light. Neck:      Thyroid: No thyromegaly. Cardiovascular:      Rate and Rhythm: Normal rate and regular rhythm. Pulmonary:      Effort: Pulmonary effort is normal.      Breath sounds: Normal breath sounds. Chest:   Breasts:      Right: Normal. No axillary adenopathy or supraclavicular adenopathy. Left: Normal. No axillary adenopathy or supraclavicular adenopathy. Abdominal:      General: Bowel sounds are normal. There is no distension. Palpations: Abdomen is soft. Tenderness: There is no abdominal tenderness. Musculoskeletal:         General: Normal range of motion. Cervical back: Normal range of motion and neck supple. Right lower leg: No edema. Left lower leg: No edema. Lymphadenopathy:      Cervical: No cervical adenopathy. Upper Body:      Right upper body: No supraclavicular, axillary or pectoral adenopathy. Left upper body: No supraclavicular, axillary or pectoral adenopathy. Skin:     General: Skin is warm and dry. Findings: Rash present. Rash is macular. Neurological:      General: No focal deficit present. Mental Status: She is alert and oriented to person, place, and time. Mental status is at baseline. Psychiatric:         Mood and Affect: Mood normal.         Behavior: Behavior normal.            Assessment/plan    1. Tinea versicolor  Patient reports rash that started 10 days ago. Patient went to urgent care and was prescribed Singulair and Medrol Dosepak. Patient completed the Dosepak and reports worsening of symptoms. Rash is macular and is present all over her body. It is spreading. She denies pruritus. I have advised to stop Singulair.    - ketoconazole (NIZORAL) 2 % shampoo; Apply to affected area of damp skin, lather, leave on 5 minutes, and rinse  Dispense: 120 mL; Refill: 1  - CBC WITH AUTOMATED DIFF; Future  - CBC WITH AUTOMATED DIFF    2. Rash and nonspecific skin eruption    - fexofenadine (ALLEGRA) 180 mg tablet;  Take 1 Tablet by mouth daily as needed for Allergies. Dispense: 30 Tablet; Refill: 0  - REFERRAL TO DERMATOLOGY  - REFERRAL TO ALLERGY  - CBC WITH AUTOMATED DIFF; Future  - CBC WITH AUTOMATED DIFF    3. Encounter for vitamin deficiency screening    - VITAMIN D, 25 HYDROXY; Future  - VITAMIN D, 25 HYDROXY    4. Hyperlipidemia, unspecified hyperlipidemia type    - METABOLIC PANEL, COMPREHENSIVE; Future  - METABOLIC PANEL, COMPREHENSIVE    5. Annual physical exam    - THYROID CASCADE PROFILE; Future  - URINALYSIS W/ REFLEX CULTURE; Future  - CBC WITH AUTOMATED DIFF; Future  - METABOLIC PANEL, COMPREHENSIVE; Future  - LIPID PANEL; Future  - VITAMIN D, 25 HYDROXY; Future  - VITAMIN D, 25 HYDROXY  - METABOLIC PANEL, COMPREHENSIVE  - CBC WITH AUTOMATED DIFF  - URINALYSIS W/ REFLEX CULTURE  - THYROID CASCADE PROFILE  - LIPID PANEL    6. Screening breast examination          34 y.o. female who presents today for annual exam. UTD on screening. Not UTD on vaccines. Will check routine labs. Encouraged daily exercise and trying to eat a well balanced diet. I have discussed the diagnosis with the patient and the intended plan as seen in the above orders. The patient has received an after-visit summary and questions were answered concerning future plans. I have discussed medication side effects and warnings with the patient as well. The patient agrees and understands above plan. Follow-up and Dispositions    · Return in about 2 weeks (around 7/5/2022) for follow up.            Maame Torre MD

## 2022-06-21 NOTE — PROGRESS NOTES
Patient stated name &     Chief Complaint   Patient presents with    Complete Physical     Also rash all over body    Itching      Went to [facility]      Noticed 22            Health Maintenance Due   Topic    DTaP/Tdap/Td series (1 - Tdap)    Cervical cancer screen     Depression Screen        Wt Readings from Last 3 Encounters:   22 206 lb 3.2 oz (93.5 kg)   20 199 lb (90.3 kg)   17 222 lb (100.7 kg)     Temp Readings from Last 3 Encounters:   22 97.6 °F (36.4 °C) (Temporal)   20 97 °F (36.1 °C) (Oral)   17 98.1 °F (36.7 °C)     BP Readings from Last 3 Encounters:   22 117/79   20 115/75   17 124/84     Pulse Readings from Last 3 Encounters:   22 76   20 91   17 99         Learning Assessment:  :     No flowsheet data found. Depression Screening:  :     3 most recent PHQ Screens 2020   Little interest or pleasure in doing things Not at all   Feeling down, depressed, irritable, or hopeless Not at all   Total Score PHQ 2 0       Fall Risk Assessment:  :     No flowsheet data found. Abuse Screening:  :     No flowsheet data found. Coordination of Care Questionnaire:  :     1) Have you been to an emergency room, urgent care clinic since your last visit? Yes   [facility]   Rash  Hospitalized since your last visit? no             2) Have you seen or consulted any other health care providers outside of [facility] since your last visit? no  (Include any pap smears or colon screenings in this section.)    3) Do you have an Advance Directive on file? no  Are you interested in receiving information about Advance Directives? no    Patient is accompanied by self I have received verbal consent from [facility] to discuss any/all medical information while they are present in the room.

## 2022-06-22 LAB — TSH SERPL-ACNC: 1.16 UIU/ML (ref 0.45–4.5)

## 2022-06-28 DIAGNOSIS — E78.2 MIXED HYPERLIPIDEMIA: Primary | ICD-10-CM

## 2022-06-28 DIAGNOSIS — E55.9 VITAMIN D DEFICIENCY: ICD-10-CM

## 2022-06-28 DIAGNOSIS — E78.5 HYPERLIPIDEMIA, UNSPECIFIED HYPERLIPIDEMIA TYPE: ICD-10-CM

## 2022-06-28 RX ORDER — ERGOCALCIFEROL 1.25 MG/1
50000 CAPSULE ORAL
Qty: 12 CAPSULE | Refills: 5 | Status: SHIPPED | OUTPATIENT
Start: 2022-06-28

## 2024-11-06 ENCOUNTER — OFFICE VISIT (OUTPATIENT)
Age: 34
End: 2024-11-06

## 2024-11-06 VITALS
WEIGHT: 221 LBS | BODY MASS INDEX: 34.69 KG/M2 | TEMPERATURE: 100.3 F | HEART RATE: 111 BPM | OXYGEN SATURATION: 95 % | RESPIRATION RATE: 16 BRPM | DIASTOLIC BLOOD PRESSURE: 71 MMHG | SYSTOLIC BLOOD PRESSURE: 108 MMHG | HEIGHT: 67 IN

## 2024-11-06 DIAGNOSIS — J18.9 PNEUMONIA OF RIGHT MIDDLE LOBE DUE TO INFECTIOUS ORGANISM: Primary | ICD-10-CM

## 2024-11-06 RX ORDER — BENZONATATE 100 MG/1
100 CAPSULE ORAL 3 TIMES DAILY PRN
Qty: 30 CAPSULE | Refills: 0 | Status: SHIPPED | OUTPATIENT
Start: 2024-11-06 | End: 2024-11-16

## 2024-11-06 RX ORDER — DEXTROMETHORPHAN HYDROBROMIDE AND PROMETHAZINE HYDROCHLORIDE 15; 6.25 MG/5ML; MG/5ML
5 SYRUP ORAL 4 TIMES DAILY PRN
Qty: 118 ML | Refills: 0 | Status: SHIPPED | OUTPATIENT
Start: 2024-11-06 | End: 2024-11-13

## 2024-11-06 RX ORDER — AZITHROMYCIN 250 MG/1
250 TABLET, FILM COATED ORAL SEE ADMIN INSTRUCTIONS
Qty: 6 TABLET | Refills: 0 | Status: SHIPPED | OUTPATIENT
Start: 2024-11-06 | End: 2024-11-11

## 2024-11-06 RX ORDER — ALBUTEROL SULFATE 90 UG/1
2 INHALANT RESPIRATORY (INHALATION) EVERY 6 HOURS PRN
Qty: 18 G | Refills: 0 | Status: SHIPPED | OUTPATIENT
Start: 2024-11-06

## 2024-11-06 RX ORDER — AMOXICILLIN 500 MG/1
1000 CAPSULE ORAL 3 TIMES DAILY
Qty: 30 CAPSULE | Refills: 0 | Status: SHIPPED | OUTPATIENT
Start: 2024-11-06 | End: 2024-11-11

## 2024-11-07 NOTE — PROGRESS NOTES
2024   Romelia Kim (: 1990) is a 34 y.o. female, New patient, here for evaluation of the following chief complaint(s):  Cough (Patient presents for fatigue, chills, headaches, sore throat and cough which started on 2024. Patient has tried DayQuil and NyQuil without relief. )     ASSESSMENT/PLAN:  Below is the assessment and plan developed based on review of pertinent history, physical exam, labs, studies, and medications.       Assessment & Plan  Pneumonia of right middle lobe due to infectious organism  Patient in no acute distress    Patient presents with likely acute pneumonia based on the patient's changing pattern of cough productivity and cough duration with possible reactive airway disease symptoms.  No suggestion PE (low risk Wells score).    Do not suspect underlying cardiopulmonary process.  I considered, but think unlikely, dangerous causes of this patient's symptoms to include ACS, CHF or COPD exacerbations, pneumothorax.   Patient is nontoxic appearing and not in need of emergent medical intervention.    The patient is a good candidate for outpatient therapy based on normal PO intake, reassuring exam with clear lungs on auscultation, normal oxygen saturations, and lack of respiratory distress upon discharge.  Patient has been prescribed initial choice of appropriate antibiotics to cover most likely pathogens.    Discharge decision based on the following:  clinical impression is consistent with outpatient treatment, patient's exam is stable and patient's condition is stable.  -Provided reassurance and reassessment  -Discussed over-the-counter medications for symptomatic relief  -Provided educational material and patient instructions  -Discharged patient with instructions to follow up with PCP  -Advised to go immediately to the ED for worsening or persistent symptoms.  Particular warning if they become more dyspneic.     Orders:    albuterol sulfate HFA

## 2024-11-07 NOTE — PATIENT INSTRUCTIONS
Thank you for visiting [facility] today.    -Drink plenty of fluids to keep hydrated  -Rest  -Ibuprofen/Tylenol for pain/fever/body aches  -Follow up with PCP in 24-48 hours    If you begin to have worsening symptoms such as shortness of breath, uncontrolled fever of more than 100.4 or chest pain, please go to the ER.

## 2025-02-03 NOTE — PROGRESS NOTES
[facility]    Subjective   Romelia Perez Kim is a 34 y.o. female who presents for New Patient  No previous care.     Establish Care  Medical problems include: none  Does not take any medicines.  Concerns-  Has had ongoing headaches for several months.  Feels like there is a pressure/tightness connecting her trap muscle up to her head.  Improved with Tylenol and with massage of her trap muscle.  No vision changes.  No injuries.  Also notes pain behind her left ear that is induced by pressing on the left stylus process area.  Occurs when she sleeps on that side, presses, or has sunglasses placed in the area.  No bumps or lumps noted.  No fevers.  Has also noted in the last several weeks when laying down to hear the sensation of her heartbeat in her left ear.  Denies palpitations or chest pain.  Denies high-pitched ringing sound.  Denies whooshing sound.  Does not hear the heartbeat at any other point in the day.  Only when lying down.  No history or family history of vascular problems, aneurysms, blood clots.  No syncope.  No neurological or vision changes.    Diet: varied diet - briefly on carnivore diet after Thanksgiving.   Exercise: walks regularly, gym workouts   Tobacco: N  Alcohol: N  Work: N  Family hx: Grandmother passed from ovarian cancer at 29.    HM:  [facility] /2024. No abnormal pap smears.    Mammo:   Flu: none     GYN;  LMP 1/22/25   Regular periods.   Sexually active yes. Not using birth control.   Wound not like STI screening.       Review of Systems   Review of Systems       Medical History  Past Medical History:   Diagnosis Date    Genital herpes, unspecified        Medications  Current Outpatient Medications   Medication Sig    albuterol sulfate HFA (PROVENTIL;VENTOLIN;PROAIR) 108 (90 Base) MCG/ACT inhaler Inhale 2 puffs into the lungs every 6 hours as needed for Wheezing (Patient not taking: Reported on 2/4/2025)

## 2025-02-04 ENCOUNTER — OFFICE VISIT (OUTPATIENT)
Age: 35
End: 2025-02-04

## 2025-02-04 VITALS
HEIGHT: 67 IN | SYSTOLIC BLOOD PRESSURE: 98 MMHG | BODY MASS INDEX: 32.8 KG/M2 | OXYGEN SATURATION: 97 % | HEART RATE: 85 BPM | RESPIRATION RATE: 18 BRPM | TEMPERATURE: 98 F | DIASTOLIC BLOOD PRESSURE: 62 MMHG | WEIGHT: 209 LBS

## 2025-02-04 DIAGNOSIS — Z83.3 FAMILY HISTORY OF DIABETES MELLITUS: ICD-10-CM

## 2025-02-04 DIAGNOSIS — E78.2 MIXED HYPERLIPIDEMIA: ICD-10-CM

## 2025-02-04 DIAGNOSIS — Z76.89 ENCOUNTER TO ESTABLISH CARE: ICD-10-CM

## 2025-02-04 DIAGNOSIS — G44.86 CERVICOGENIC HEADACHE: Primary | ICD-10-CM

## 2025-02-04 PROCEDURE — 99203 OFFICE O/P NEW LOW 30 MIN: CPT

## 2025-02-04 SDOH — ECONOMIC STABILITY: FOOD INSECURITY: WITHIN THE PAST 12 MONTHS, THE FOOD YOU BOUGHT JUST DIDN'T LAST AND YOU DIDN'T HAVE MONEY TO GET MORE.: NEVER TRUE

## 2025-02-04 SDOH — ECONOMIC STABILITY: FOOD INSECURITY: WITHIN THE PAST 12 MONTHS, YOU WORRIED THAT YOUR FOOD WOULD RUN OUT BEFORE YOU GOT MONEY TO BUY MORE.: NEVER TRUE

## 2025-02-04 ASSESSMENT — PATIENT HEALTH QUESTIONNAIRE - PHQ9
SUM OF ALL RESPONSES TO PHQ QUESTIONS 1-9: 0
SUM OF ALL RESPONSES TO PHQ QUESTIONS 1-9: 0
1. LITTLE INTEREST OR PLEASURE IN DOING THINGS: NOT AT ALL
SUM OF ALL RESPONSES TO PHQ QUESTIONS 1-9: 0
SUM OF ALL RESPONSES TO PHQ9 QUESTIONS 1 & 2: 0
2. FEELING DOWN, DEPRESSED OR HOPELESS: NOT AT ALL
SUM OF ALL RESPONSES TO PHQ QUESTIONS 1-9: 0

## 2025-02-04 NOTE — PROGRESS NOTES
Room 20    Patient is accompanied by self. I have received verbal consent from Romelia Kim to discuss any/all medical information while they are present in the room.    Identified pt with two pt identifiers(name and ). Reviewed record in preparation for visit and have obtained necessary documentation.  Chief Complaint   Patient presents with    New Patient      Pain behind left ear, when she lies down she hears heartbeat , going on x couple months, denies dizziness , N/V , F/C     Headache left side of head , has taken ibuprofen and does help      Can feel when she stretches neck       Health Maintenance Due   Topic    Depression Screen     Varicella vaccine (1 of 2 - 13+ 2-dose series)    Hepatitis B vaccine (1 of 3 - 19+ 3-dose series)    DTaP/Tdap/Td vaccine (1 - Tdap)    HPV vaccine (2 - 3-dose series)    Cervical cancer screen     Diabetes screen     Flu vaccine (1)    COVID-19 Vaccine (3 - 2023-24 season)       Vitals:    25 1101   BP: 98/62   Site: Left Upper Arm   Position: Sitting   Cuff Size: Small Adult   Pulse: 85   Resp: 18   Temp: 98 °F (36.7 °C)   TempSrc: Temporal   SpO2: 97%   Weight: 94.8 kg (209 lb)   Height: 1.702 m (5' 7\")       Social Determinants Of Health:       SDOH screening completed at visit.  Resources Declined.   See AVS for attached resources, if requested.    Coordination of Care Questionnaire:       \"Have you been to the ER, urgent care clinic since your last visit?  Hospitalized since your last visit?\"    NO    “Have you seen or consulted any other health care providers outside of [facility] since your last visit?”    NO     “Have you had a pap smear?”    YES - Where: [facility] - may 2024 last pap       No cervical cancer screening on file             Click Here for Release of Records Request